# Patient Record
Sex: MALE | Race: OTHER | NOT HISPANIC OR LATINO
[De-identification: names, ages, dates, MRNs, and addresses within clinical notes are randomized per-mention and may not be internally consistent; named-entity substitution may affect disease eponyms.]

---

## 2018-11-19 PROBLEM — Z00.00 ENCOUNTER FOR PREVENTIVE HEALTH EXAMINATION: Status: ACTIVE | Noted: 2018-11-19

## 2018-12-05 ENCOUNTER — APPOINTMENT (OUTPATIENT)
Dept: CT IMAGING | Facility: HOSPITAL | Age: 83
End: 2018-12-05

## 2018-12-05 VITALS
RESPIRATION RATE: 20 BRPM | SYSTOLIC BLOOD PRESSURE: 141 MMHG | OXYGEN SATURATION: 93 % | DIASTOLIC BLOOD PRESSURE: 70 MMHG | HEART RATE: 106 BPM

## 2018-12-05 RX ORDER — TERAZOSIN HYDROCHLORIDE 10 MG/1
1 CAPSULE ORAL
Qty: 0 | Refills: 0 | COMMUNITY

## 2018-12-05 RX ORDER — FINASTERIDE 5 MG/1
1 TABLET, FILM COATED ORAL
Qty: 0 | Refills: 0 | COMMUNITY
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
## 2018-12-05 NOTE — H&P ADULT - NSHPSOCIALHISTORY_GEN_ALL_CORE
Former smoker (1ppd x approx 30 years), quit 40 years ago. Former ETOH use, none in the last several years. Denies illicit drug use.

## 2018-12-05 NOTE — H&P ADULT - PMH
Atrial flutter    BPH (benign prostatic hyperplasia)    Congestive heart failure    Coronary artery disease    HTN (hypertension)    Neuropathy

## 2018-12-05 NOTE — H&P ADULT - ASSESSMENT
86 y/o M, former smoker, walks with cane, with PMHx HTN, CAD s/p prior MI (denies history of stents), CHF (on Lasix BID, EF unknown), atrial fibrillation/flutter on Coumadin (last dose 12/4/18), PUD (no history of GI bleeding), recently diagnosed KOKO (does not yet have machine for home), multiple falls in the last 2 months, ?CKD (Cr 1.30 in October 2018), who initially presented to emergency room in [facility], [place] c/o worsening shortness of breath with palpitations. In light of patient’s risk factors, CCS Class III-IV angina equivalent symptoms, and extensive coronary calcium score, he is recommended for right and left cardiac catheterization with possible intervention if clinically indicated.     Patient's labs on arrival significant for INR 4.07, platelets 79, BUN/Cr 24/1.37, labs reviewed with Dr. Carlisle. Patient loaded with ASA 325mg and Plavix 600mg and hydrated with 0.45NS @ 50cc/hr pre-procedure as per Dr. Carlisle.     ASA III and Mallampati III  Risks & benefits of procedure and alternative therapy have been explained to the patient including but not limited to: allergic reaction, bleeding w/possible need for blood transfusion, infection, renal and vascular compromise, limb damage, arrhythmia, stroke, vessel dissection/perforation, Myocardial infarction, emergent CABG. Informed consent obtained and in chart. 86 y/o M, former smoker, walks with cane, with PMHx HTN, CAD s/p prior MI (denies history of stents), CHF (on Lasix BID, EF unknown), atrial fibrillation/flutter on Coumadin (last dose 12/4/18), PUD (no history of GI bleeding), recently diagnosed KOKO (does not yet have machine for home), multiple falls in the last 2 months, ?CKD (Cr 1.30 in October 2018), who initially presented to emergency room in [facility] c/o worsening shortness of breath with palpitations. In light of patient’s risk factors, CCS Class III-IV angina equivalent symptoms, and extensive coronary calcium score, he is recommended for right and left cardiac catheterization with possible intervention if clinically indicated.     Patient's labs on arrival significant for INR 4.07, platelets 79 (improved from 32 in October 2018), BUN/Cr 24/1.37, H/H stable at 15.4/46.8, labs reviewed with Dr. Carlisle. Patient loaded with ASA 325mg and Plavix 600mg and hydrated with 0.45NS @ 50cc/hr pre-procedure as per Dr. Carlisle.     ASA III and Mallampati III  Risks & benefits of procedure and alternative therapy have been explained to the patient including but not limited to: allergic reaction, bleeding w/possible need for blood transfusion, infection, renal and vascular compromise, limb damage, arrhythmia, stroke, vessel dissection/perforation, Myocardial infarction, emergent CABG. Informed consent obtained and in chart. 84 y/o M, former smoker, walks with cane, with PMHx HTN, CAD s/p prior MI (denies history of stents), CHF (on Lasix BID, EF unknown), atrial fibrillation/flutter on Coumadin (last dose 12/4/18), PUD (no history of GI bleeding), recently diagnosed KOKO (does not yet have machine for home), multiple falls in the last 2 months, ?CKD (Cr 1.30 in October 2018), who initially presented to emergency room in [facility] c/o worsening shortness of breath with palpitations. In light of patient’s risk factors, CCS Class III-IV angina equivalent symptoms, and extensive coronary calcium score, he is recommended for right and left cardiac catheterization with possible intervention if clinically indicated.     Patient's labs on arrival significant for INR 4.07, platelets 79 (improved from 32 in October 2018), BUN/Cr 24/1.37, H/H stable at 15.4/46.8, labs reviewed with Dr. Carlisle. Patient loaded with ASA 325mg and Plavix 600mg and hydrated with 0.45NS @ 50cc/hr pre-procedure as per Dr. Carlisle.     Platelet blue top tube sent showing platelets 59, Dr. Carlisle aware. Will pursue further work-up post-procedure. Type and screen sent.     ASA III and Mallampati III  Risks & benefits of procedure and alternative therapy have been explained to the patient including but not limited to: allergic reaction, bleeding w/possible need for blood transfusion, infection, renal and vascular compromise, limb damage, arrhythmia, stroke, vessel dissection/perforation, Myocardial infarction, emergent CABG. Informed consent obtained and in chart.

## 2018-12-05 NOTE — H&P ADULT - HISTORY OF PRESENT ILLNESS
84 y/o M, walks with cane, with PMHx HTN, CAD s/p prior MI with PCI?, CHF, atrial fibrillation/flutter on Coumadin (last dose 12/4/18), PUD (no history of GI bleeding), recently diagnosed KOKO (does not yet have machine for home), multiple falls in the last 2 months, who initially presented to emergency room in [facility] c/o worsening shortness of breath with palpitations. He has been experiencing shortness of breath with chest tightness for the last 2 months. He visited his primary doctor who adjusted his medications which improved his LE edema (patient reports chronic LE edema with blistering) but his SOB persisted. He can only walk < 1 block before he must stop secondary to SOB and chest tightness. He also reports difficulty sleeping/lying flat at night and frequently has only been able to sleep sitting up in a chair recently. He denies n/v, diaphoresis, lightheadedness, PND, or syncope. He had CT Chest/Calcium score performed 10/17/18 showed calcium score 2446; LM 0, , LAD 1094, LCx 615, The CTA with contrast was not performed as it was contraindicated given the extensive coronary calcium score and the irregular heartbeats due to atrial fibrillation. Also of note, CT Chest 10/20/18 showed 3.5x3.1cm infra-renal abdominal aortic aneurysm. Labs from 10/22/18 showed platelets 32. BUN/Cr 25/1.30. In light of patient’s risk factors, CCS Class III-IV angina equivalent symptoms, and extensive coronary calcium score, he is recommended for cardiac catheterization with possible intervention if clinically indicated. 84 y/o M, former smoker, walks with cane, with PMHx HTN, CAD s/p prior MI (denies history of stents), CHF (on Lasix BID, EF unknown), atrial fibrillation/flutter on Coumadin (last dose 12/4/18), PUD (no history of GI bleeding), recently diagnosed KOKO (does not yet have machine for home), multiple falls in the last 2 months, who initially presented to emergency room in [place] c/o worsening shortness of breath with palpitations. He has been experiencing shortness of breath with chest tightness for the last 2 months. He visited his primary doctor who adjusted his medications which improved his LE edema (patient reports chronic LE edema with blistering) but his SOB persisted. He can only walk < 1 block before he must stop secondary to SOB and chest tightness. He also reports difficulty sleeping/lying flat at night and frequently has only been able to sleep sitting up in a chair recently. He denies n/v, diaphoresis, lightheadedness, PND, or syncope. He had CT Chest/Calcium score performed 10/17/18 showed calcium score 2446; LM 0, , LAD 1094, LCx 615, The CTA with contrast was not performed as it was contraindicated given the extensive coronary calcium score and the irregular heartbeats due to atrial fibrillation. Also of note, CT Chest 10/20/18 showed 3.5x3.1cm infra-renal abdominal aortic aneurysm. Labs from 10/22/18 showed platelets 32. BUN/Cr 25/1.30. In light of patient’s risk factors, CCS Class III-IV angina equivalent symptoms, and extensive coronary calcium score, he is recommended for cardiac catheterization with possible intervention if clinically indicated. 86 y/o M, former smoker, walks with cane, with PMHx HTN, CAD s/p prior MI (denies history of stents), CHF (on Lasix BID, EF unknown), atrial fibrillation/flutter on Coumadin (last dose 12/4/18), PUD (no history of GI bleeding), recently diagnosed KOKO (does not yet have machine for home), multiple falls in the last 2 months, ?CKD (Cr 1.30 in October 2018), who initially presented to emergency room in [facility] c/o worsening shortness of breath with palpitations. He has been experiencing shortness of breath with chest tightness for the last 2 months. He visited his primary doctor who adjusted his medications which improved his LE edema (patient reports chronic LE edema with blistering) but his SOB persisted. He can only walk < 1 block before he must stop secondary to SOB and chest tightness. He also reports difficulty sleeping/lying flat at night and frequently has only been able to sleep sitting up in a chair recently. He denies n/v, diaphoresis, lightheadedness, PND, or syncope. He had CT Chest/Calcium score performed 10/17/18 showed calcium score 2446; LM 0, , LAD 1094, LCx 615, The CTA with contrast was not performed as it was contraindicated given the extensive coronary calcium score and the irregular heartbeats due to atrial fibrillation. Also of note, CT Chest 10/20/18 showed 3.5x3.1cm infra-renal abdominal aortic aneurysm. Labs from 10/22/18 showed platelets 32. BUN/Cr 25/1.30. In light of patient’s risk factors, CCS Class III-IV angina equivalent symptoms, and extensive coronary calcium score, he is recommended for cardiac catheterization with possible intervention if clinically indicated.

## 2018-12-06 ENCOUNTER — INPATIENT (INPATIENT)
Facility: HOSPITAL | Age: 83
LOS: 8 days | Discharge: HOME HEALTH | DRG: 233 | End: 2018-12-15
Attending: THORACIC SURGERY (CARDIOTHORACIC VASCULAR SURGERY) | Admitting: THORACIC SURGERY (CARDIOTHORACIC VASCULAR SURGERY)
Payer: COMMERCIAL

## 2018-12-06 ENCOUNTER — TRANSCRIPTION ENCOUNTER (OUTPATIENT)
Age: 83
End: 2018-12-06

## 2018-12-06 DIAGNOSIS — Z87.828 PERSONAL HISTORY OF OTHER (HEALED) PHYSICAL INJURY AND TRAUMA: Chronic | ICD-10-CM

## 2018-12-06 DIAGNOSIS — Z98.890 OTHER SPECIFIED POSTPROCEDURAL STATES: Chronic | ICD-10-CM

## 2018-12-06 DIAGNOSIS — K56.609 UNSPECIFIED INTESTINAL OBSTRUCTION, UNSPECIFIED AS TO PARTIAL VERSUS COMPLETE OBSTRUCTION: Chronic | ICD-10-CM

## 2018-12-06 DIAGNOSIS — Z90.49 ACQUIRED ABSENCE OF OTHER SPECIFIED PARTS OF DIGESTIVE TRACT: Chronic | ICD-10-CM

## 2018-12-06 LAB
ALBUMIN SERPL ELPH-MCNC: 3.8 G/DL — SIGNIFICANT CHANGE UP (ref 3.3–5)
ALP SERPL-CCNC: 81 U/L — SIGNIFICANT CHANGE UP (ref 40–120)
ALT FLD-CCNC: 32 U/L — SIGNIFICANT CHANGE UP (ref 10–45)
ANION GAP SERPL CALC-SCNC: 7 MMOL/L — SIGNIFICANT CHANGE UP (ref 5–17)
APTT BLD: 46.5 SEC — HIGH (ref 27.5–36.3)
AST SERPL-CCNC: 25 U/L — SIGNIFICANT CHANGE UP (ref 10–40)
BASOPHILS NFR BLD AUTO: 0.2 % — SIGNIFICANT CHANGE UP (ref 0–2)
BILIRUB SERPL-MCNC: 1.1 MG/DL — SIGNIFICANT CHANGE UP (ref 0.2–1.2)
BLD GP AB SCN SERPL QL: NEGATIVE — SIGNIFICANT CHANGE UP
BUN SERPL-MCNC: 24 MG/DL — HIGH (ref 7–23)
CALCIUM SERPL-MCNC: 10 MG/DL — SIGNIFICANT CHANGE UP (ref 8.4–10.5)
CHLORIDE SERPL-SCNC: 107 MMOL/L — SIGNIFICANT CHANGE UP (ref 96–108)
CHOLEST SERPL-MCNC: 124 MG/DL — SIGNIFICANT CHANGE UP (ref 10–199)
CK MB CFR SERPL CALC: 12 NG/ML — HIGH (ref 0–6.7)
CK SERPL-CCNC: 299 U/L — HIGH (ref 30–200)
CLOSURE TME COLL+EPINEP BLD: 59 K/UL — LOW (ref 150–400)
CO2 SERPL-SCNC: 29 MMOL/L — SIGNIFICANT CHANGE UP (ref 22–31)
CREAT SERPL-MCNC: 1.37 MG/DL — HIGH (ref 0.5–1.3)
CRP SERPL-MCNC: 0.07 MG/DL — SIGNIFICANT CHANGE UP (ref 0–0.4)
EOSINOPHIL NFR BLD AUTO: 1.2 % — SIGNIFICANT CHANGE UP (ref 0–6)
GLUCOSE SERPL-MCNC: 172 MG/DL — HIGH (ref 70–99)
HBA1C BLD-MCNC: 5.3 % — SIGNIFICANT CHANGE UP (ref 4–5.6)
HCT VFR BLD CALC: 46.8 % — SIGNIFICANT CHANGE UP (ref 39–50)
HDLC SERPL-MCNC: 52 MG/DL — SIGNIFICANT CHANGE UP
HGB BLD-MCNC: 15.4 G/DL — SIGNIFICANT CHANGE UP (ref 13–17)
INR BLD: 4.05 — HIGH (ref 0.88–1.16)
LIPID PNL WITH DIRECT LDL SERPL: 61 MG/DL — SIGNIFICANT CHANGE UP
LYMPHOCYTES # BLD AUTO: 6.1 % — LOW (ref 13–44)
MCHC RBC-ENTMCNC: 29 PG — SIGNIFICANT CHANGE UP (ref 27–34)
MCHC RBC-ENTMCNC: 32.9 G/DL — SIGNIFICANT CHANGE UP (ref 32–36)
MCV RBC AUTO: 88.1 FL — SIGNIFICANT CHANGE UP (ref 80–100)
MONOCYTES NFR BLD AUTO: 8.4 % — SIGNIFICANT CHANGE UP (ref 2–14)
NEUTROPHILS NFR BLD AUTO: 84.1 % — HIGH (ref 43–77)
PLATELET # BLD AUTO: 79 K/UL — LOW (ref 150–400)
POTASSIUM SERPL-MCNC: 4.2 MMOL/L — SIGNIFICANT CHANGE UP (ref 3.5–5.3)
POTASSIUM SERPL-SCNC: 4.2 MMOL/L — SIGNIFICANT CHANGE UP (ref 3.5–5.3)
PROT SERPL-MCNC: 6.4 G/DL — SIGNIFICANT CHANGE UP (ref 6–8.3)
PROTHROM AB SERPL-ACNC: 47.8 SEC — HIGH (ref 10–12.9)
RBC # BLD: 5.31 M/UL — SIGNIFICANT CHANGE UP (ref 4.2–5.8)
RBC # FLD: 14.1 % — SIGNIFICANT CHANGE UP (ref 10.3–16.9)
RH IG SCN BLD-IMP: POSITIVE — SIGNIFICANT CHANGE UP
SODIUM SERPL-SCNC: 143 MMOL/L — SIGNIFICANT CHANGE UP (ref 135–145)
TOTAL CHOLESTEROL/HDL RATIO MEASUREMENT: 2.4 RATIO — LOW (ref 3.4–9.6)
TRIGL SERPL-MCNC: 56 MG/DL — SIGNIFICANT CHANGE UP (ref 10–149)
WBC # BLD: 5.9 K/UL — SIGNIFICANT CHANGE UP (ref 3.8–10.5)
WBC # FLD AUTO: 5.9 K/UL — SIGNIFICANT CHANGE UP (ref 3.8–10.5)

## 2018-12-06 PROCEDURE — 93460 R&L HRT ART/VENTRICLE ANGIO: CPT | Mod: 26

## 2018-12-06 PROCEDURE — 93010 ELECTROCARDIOGRAM REPORT: CPT

## 2018-12-06 RX ORDER — METOPROLOL TARTRATE 50 MG
50 TABLET ORAL ONCE
Qty: 0 | Refills: 0 | Status: COMPLETED | OUTPATIENT
Start: 2018-12-06 | End: 2018-12-06

## 2018-12-06 RX ORDER — ATORVASTATIN CALCIUM 80 MG/1
80 TABLET, FILM COATED ORAL AT BEDTIME
Qty: 0 | Refills: 0 | Status: DISCONTINUED | OUTPATIENT
Start: 2018-12-06 | End: 2018-12-07

## 2018-12-06 RX ORDER — SODIUM CHLORIDE 9 MG/ML
500 INJECTION, SOLUTION INTRAVENOUS
Qty: 0 | Refills: 0 | Status: DISCONTINUED | OUTPATIENT
Start: 2018-12-06 | End: 2018-12-06

## 2018-12-06 RX ORDER — ASPIRIN/CALCIUM CARB/MAGNESIUM 324 MG
325 TABLET ORAL ONCE
Qty: 0 | Refills: 0 | Status: COMPLETED | OUTPATIENT
Start: 2018-12-06 | End: 2018-12-06

## 2018-12-06 RX ORDER — LISINOPRIL 2.5 MG/1
5 TABLET ORAL DAILY
Qty: 0 | Refills: 0 | Status: DISCONTINUED | OUTPATIENT
Start: 2018-12-06 | End: 2018-12-08

## 2018-12-06 RX ORDER — CHLORHEXIDINE GLUCONATE 213 G/1000ML
1 SOLUTION TOPICAL ONCE
Qty: 0 | Refills: 0 | Status: DISCONTINUED | OUTPATIENT
Start: 2018-12-06 | End: 2018-12-06

## 2018-12-06 RX ORDER — FUROSEMIDE 40 MG
40 TABLET ORAL ONCE
Qty: 0 | Refills: 0 | Status: COMPLETED | OUTPATIENT
Start: 2018-12-06 | End: 2018-12-06

## 2018-12-06 RX ORDER — DILTIAZEM HCL 120 MG
180 CAPSULE, EXT RELEASE 24 HR ORAL DAILY
Qty: 0 | Refills: 0 | Status: DISCONTINUED | OUTPATIENT
Start: 2018-12-06 | End: 2018-12-10

## 2018-12-06 RX ORDER — INFLUENZA VIRUS VACCINE 15; 15; 15; 15 UG/.5ML; UG/.5ML; UG/.5ML; UG/.5ML
0.5 SUSPENSION INTRAMUSCULAR ONCE
Qty: 0 | Refills: 0 | Status: DISCONTINUED | OUTPATIENT
Start: 2018-12-06 | End: 2018-12-07

## 2018-12-06 RX ORDER — CLOPIDOGREL BISULFATE 75 MG/1
600 TABLET, FILM COATED ORAL ONCE
Qty: 0 | Refills: 0 | Status: COMPLETED | OUTPATIENT
Start: 2018-12-06 | End: 2018-12-06

## 2018-12-06 RX ORDER — FINASTERIDE 5 MG/1
5 TABLET, FILM COATED ORAL DAILY
Qty: 0 | Refills: 0 | Status: DISCONTINUED | OUTPATIENT
Start: 2018-12-06 | End: 2018-12-07

## 2018-12-06 RX ORDER — FUROSEMIDE 40 MG
40 TABLET ORAL
Qty: 0 | Refills: 0 | Status: DISCONTINUED | OUTPATIENT
Start: 2018-12-07 | End: 2018-12-07

## 2018-12-06 RX ADMIN — Medication 5 MILLIGRAM(S): at 22:19

## 2018-12-06 RX ADMIN — Medication 50 MILLIGRAM(S): at 18:53

## 2018-12-06 RX ADMIN — Medication 325 MILLIGRAM(S): at 13:55

## 2018-12-06 RX ADMIN — Medication 40 MILLIGRAM(S): at 18:53

## 2018-12-06 RX ADMIN — ATORVASTATIN CALCIUM 80 MILLIGRAM(S): 80 TABLET, FILM COATED ORAL at 22:19

## 2018-12-06 RX ADMIN — SODIUM CHLORIDE 50 MILLILITER(S): 9 INJECTION, SOLUTION INTRAVENOUS at 13:48

## 2018-12-06 RX ADMIN — CLOPIDOGREL BISULFATE 600 MILLIGRAM(S): 75 TABLET, FILM COATED ORAL at 13:55

## 2018-12-07 DIAGNOSIS — N18.9 CHRONIC KIDNEY DISEASE, UNSPECIFIED: ICD-10-CM

## 2018-12-07 DIAGNOSIS — N40.0 BENIGN PROSTATIC HYPERPLASIA WITHOUT LOWER URINARY TRACT SYMPTOMS: ICD-10-CM

## 2018-12-07 DIAGNOSIS — I10 ESSENTIAL (PRIMARY) HYPERTENSION: ICD-10-CM

## 2018-12-07 DIAGNOSIS — I25.10 ATHEROSCLEROTIC HEART DISEASE OF NATIVE CORONARY ARTERY WITHOUT ANGINA PECTORIS: ICD-10-CM

## 2018-12-07 DIAGNOSIS — D69.6 THROMBOCYTOPENIA, UNSPECIFIED: ICD-10-CM

## 2018-12-07 DIAGNOSIS — I50.9 HEART FAILURE, UNSPECIFIED: ICD-10-CM

## 2018-12-07 DIAGNOSIS — N18.3 CHRONIC KIDNEY DISEASE, STAGE 3 (MODERATE): ICD-10-CM

## 2018-12-07 DIAGNOSIS — I48.92 UNSPECIFIED ATRIAL FLUTTER: ICD-10-CM

## 2018-12-07 DIAGNOSIS — I20.0 UNSTABLE ANGINA: ICD-10-CM

## 2018-12-07 DIAGNOSIS — I50.33 ACUTE ON CHRONIC DIASTOLIC (CONGESTIVE) HEART FAILURE: ICD-10-CM

## 2018-12-07 PROBLEM — G62.9 POLYNEUROPATHY, UNSPECIFIED: Chronic | Status: ACTIVE | Noted: 2018-12-05

## 2018-12-07 LAB
ALBUMIN SERPL ELPH-MCNC: 3.5 G/DL — SIGNIFICANT CHANGE UP (ref 3.3–5)
ALP SERPL-CCNC: 79 U/L — SIGNIFICANT CHANGE UP (ref 40–120)
ALT FLD-CCNC: 26 U/L — SIGNIFICANT CHANGE UP (ref 10–45)
ANION GAP SERPL CALC-SCNC: 5 MMOL/L — SIGNIFICANT CHANGE UP (ref 5–17)
APPEARANCE UR: CLEAR — SIGNIFICANT CHANGE UP
APPEARANCE UR: CLEAR — SIGNIFICANT CHANGE UP
APTT BLD: 41.2 SEC — HIGH (ref 27.5–36.3)
AST SERPL-CCNC: 17 U/L — SIGNIFICANT CHANGE UP (ref 10–40)
BASOPHILS NFR BLD AUTO: 0.2 % — SIGNIFICANT CHANGE UP (ref 0–2)
BILIRUB SERPL-MCNC: 1.1 MG/DL — SIGNIFICANT CHANGE UP (ref 0.2–1.2)
BILIRUB UR-MCNC: NEGATIVE — SIGNIFICANT CHANGE UP
BILIRUB UR-MCNC: NEGATIVE — SIGNIFICANT CHANGE UP
BUN SERPL-MCNC: 19 MG/DL — SIGNIFICANT CHANGE UP (ref 7–23)
CALCIUM SERPL-MCNC: 10.1 MG/DL — SIGNIFICANT CHANGE UP (ref 8.4–10.5)
CHLORIDE SERPL-SCNC: 106 MMOL/L — SIGNIFICANT CHANGE UP (ref 96–108)
CO2 SERPL-SCNC: 32 MMOL/L — HIGH (ref 22–31)
COLOR SPEC: YELLOW — SIGNIFICANT CHANGE UP
COLOR SPEC: YELLOW — SIGNIFICANT CHANGE UP
CREAT SERPL-MCNC: 1.39 MG/DL — HIGH (ref 0.5–1.3)
DIFF PNL FLD: NEGATIVE — SIGNIFICANT CHANGE UP
DIFF PNL FLD: NEGATIVE — SIGNIFICANT CHANGE UP
EOSINOPHIL NFR BLD AUTO: 1.6 % — SIGNIFICANT CHANGE UP (ref 0–6)
GLUCOSE SERPL-MCNC: 93 MG/DL — SIGNIFICANT CHANGE UP (ref 70–99)
GLUCOSE UR QL: NEGATIVE — SIGNIFICANT CHANGE UP
GLUCOSE UR QL: NEGATIVE — SIGNIFICANT CHANGE UP
HCT VFR BLD CALC: 48 % — SIGNIFICANT CHANGE UP (ref 39–50)
HGB BLD-MCNC: 15.2 G/DL — SIGNIFICANT CHANGE UP (ref 13–17)
INR BLD: 3.36 — HIGH (ref 0.88–1.16)
KETONES UR-MCNC: NEGATIVE — SIGNIFICANT CHANGE UP
KETONES UR-MCNC: NEGATIVE — SIGNIFICANT CHANGE UP
LEUKOCYTE ESTERASE UR-ACNC: ABNORMAL
LEUKOCYTE ESTERASE UR-ACNC: ABNORMAL
LYMPHOCYTES # BLD AUTO: 7.1 % — LOW (ref 13–44)
MAGNESIUM SERPL-MCNC: 2 MG/DL — SIGNIFICANT CHANGE UP (ref 1.6–2.6)
MCHC RBC-ENTMCNC: 28.2 PG — SIGNIFICANT CHANGE UP (ref 27–34)
MCHC RBC-ENTMCNC: 31.7 G/DL — LOW (ref 32–36)
MCV RBC AUTO: 89.1 FL — SIGNIFICANT CHANGE UP (ref 80–100)
MONOCYTES NFR BLD AUTO: 10.3 % — SIGNIFICANT CHANGE UP (ref 2–14)
NEUTROPHILS NFR BLD AUTO: 80.8 % — HIGH (ref 43–77)
NITRITE UR-MCNC: NEGATIVE — SIGNIFICANT CHANGE UP
NITRITE UR-MCNC: NEGATIVE — SIGNIFICANT CHANGE UP
PH UR: 6 — SIGNIFICANT CHANGE UP (ref 5–8)
PH UR: 6 — SIGNIFICANT CHANGE UP (ref 5–8)
PLATELET # BLD AUTO: 85 K/UL — LOW (ref 150–400)
POTASSIUM SERPL-MCNC: 4.1 MMOL/L — SIGNIFICANT CHANGE UP (ref 3.5–5.3)
POTASSIUM SERPL-SCNC: 4.1 MMOL/L — SIGNIFICANT CHANGE UP (ref 3.5–5.3)
PROT SERPL-MCNC: 6.1 G/DL — SIGNIFICANT CHANGE UP (ref 6–8.3)
PROT UR-MCNC: NEGATIVE MG/DL — SIGNIFICANT CHANGE UP
PROT UR-MCNC: NEGATIVE MG/DL — SIGNIFICANT CHANGE UP
PROTHROM AB SERPL-ACNC: 39.4 SEC — HIGH (ref 10–12.9)
RBC # BLD: 5.39 M/UL — SIGNIFICANT CHANGE UP (ref 4.2–5.8)
RBC # FLD: 14 % — SIGNIFICANT CHANGE UP (ref 10.3–16.9)
SODIUM SERPL-SCNC: 143 MMOL/L — SIGNIFICANT CHANGE UP (ref 135–145)
SP GR SPEC: 1.01 — SIGNIFICANT CHANGE UP (ref 1–1.03)
SP GR SPEC: 1.02 — SIGNIFICANT CHANGE UP (ref 1–1.03)
UROBILINOGEN FLD QL: 0.2 E.U./DL — SIGNIFICANT CHANGE UP
UROBILINOGEN FLD QL: 0.2 E.U./DL — SIGNIFICANT CHANGE UP
WBC # BLD: 5.6 K/UL — SIGNIFICANT CHANGE UP (ref 3.8–10.5)
WBC # FLD AUTO: 5.6 K/UL — SIGNIFICANT CHANGE UP (ref 3.8–10.5)

## 2018-12-07 PROCEDURE — 71250 CT THORAX DX C-: CPT | Mod: 26

## 2018-12-07 PROCEDURE — 71046 X-RAY EXAM CHEST 2 VIEWS: CPT | Mod: 26

## 2018-12-07 PROCEDURE — 93306 TTE W/DOPPLER COMPLETE: CPT | Mod: 26

## 2018-12-07 RX ORDER — FUROSEMIDE 40 MG
40 TABLET ORAL
Qty: 0 | Refills: 0 | Status: DISCONTINUED | OUTPATIENT
Start: 2018-12-07 | End: 2018-12-08

## 2018-12-07 RX ADMIN — LISINOPRIL 5 MILLIGRAM(S): 2.5 TABLET ORAL at 10:23

## 2018-12-07 RX ADMIN — FINASTERIDE 5 MILLIGRAM(S): 5 TABLET, FILM COATED ORAL at 10:23

## 2018-12-07 RX ADMIN — Medication 40 MILLIGRAM(S): at 10:23

## 2018-12-07 RX ADMIN — Medication 5 MILLIGRAM(S): at 22:15

## 2018-12-07 RX ADMIN — Medication 180 MILLIGRAM(S): at 06:06

## 2018-12-07 RX ADMIN — Medication 40 MILLIGRAM(S): at 18:03

## 2018-12-07 NOTE — PROGRESS NOTE ADULT - SUBJECTIVE AND OBJECTIVE BOX
INTERVENTIONAL CARDIOLOGY FOLLOW UP NOTE    -Patient seen and examined this am  -No events overnight  -No complaints this am    VASCULAR ACCESS EXAM:     RADIAL:   2+ right/left radial pulse   Normal capillary refill. No evidence of motor or sensory deficits of digits, hand, wrist or forearm.   -Access site clean, non-tender, without ecchymosis or hematoma.    A/P  R. radial approach with no evidence of vascular complications post procedure.     -continue with current medications including dual antiplatelet therapy  -pulmonary, renal, CTS, Heme consult   -discharge instructions as per protocol   -outpatient follow up with primary cardiologist

## 2018-12-07 NOTE — PROGRESS NOTE ADULT - SUBJECTIVE AND OBJECTIVE BOX
Interventional Cardiology PA Adult Progress Note    CC: SOB and chest pain  Subjective Assessment:    Residual SOB    12 point ROS otherwise negative except as stated in HPI and subjective. 	  MEDICATIONS:  diltiazem    milliGRAM(s) Oral daily  furosemide    Tablet 40 milliGRAM(s) Oral two times a day  lisinopril 5 milliGRAM(s) Oral daily  bisacodyl 5 milliGRAM(s) Oral at bedtime  atorvastatin 80 milliGRAM(s) Oral at bedtime  finasteride 5 milliGRAM(s) Oral daily  influenza   Vaccine 0.5 milliLiter(s) IntraMuscular once      PHYSICAL EXAM:  TELEMETRY: persistent afib  T(C): 36.9 (12-07-18 @ 14:11), Max: 37.2 (12-07-18 @ 06:10)  HR: 81 (12-07-18 @ 10:21) (78 - 108)  BP: 137/85 (12-07-18 @ 10:21) (126/79 - 148/86)  RR: 18 (12-07-18 @ 10:21) (16 - 21)  SpO2: 95% (12-07-18 @ 10:21) (94% - 98%)  Wt(kg): --  I&O's Summary    06 Dec 2018 07:01  -  07 Dec 2018 07:00  --------------------------------------------------------  IN: 0 mL / OUT: 2600 mL / NET: -2600 mL    07 Dec 2018 07:01  -  07 Dec 2018 16:46  --------------------------------------------------------  IN: 360 mL / OUT: 850 mL / NET: -490 mL      Height (cm): 180.34 (12-06 @ 19:52)  Weight (kg): 104.5 (12-06 @ 19:52)  BMI (kg/m2): 32.1 (12-06 @ 19:52)  BSA (m2): 2.24 (12-06 @ 19:52)  Avina: none  Central/PICC/Mid Line: none                                     Appearance: Normal	  HEENT:   Normal oral mucosa, PERRL, EOMI	  Neck: Supple, - JVD  Cardiovascular: Normal S1 S2, No JVD, No murmurs,   Respiratory: decreased breath sounds left lower lobe. 	  Gastrointestinal:  Soft, Non-tender, + BS	  Skin: No rashes, No ecchymoses, No cyanosis  Extremities: Normal range of motion, No clubbing, cyanosis or edema  Vascular: Peripheral pulses palpable 2+ bilaterally  Neurologic: Non-focal  Psychiatry: A & O x 3, Mood & affect appropriate    LABS:                          15.2   5.6   )-----------( 85       ( 07 Dec 2018 05:42 )             48.0     12-07    143  |  106  |  19  ----------------------------<  93  4.1   |  32<H>  |  1.39<H>    Ca    10.1      07 Dec 2018 05:42  Mg     2.0     12-07    TPro  6.1  /  Alb  3.5  /  TBili  1.1  /  DBili  x   /  AST  17  /  ALT  26  /  AlkPhos  79  12-07  PT/INR - ( 07 Dec 2018 05:42 )   PT: 39.4 sec;   INR: 3.36          PTT - ( 07 Dec 2018 05:42 )  PTT:41.2 sec    ASSESSMENT/PLAN:

## 2018-12-07 NOTE — PROGRESS NOTE ADULT - ATTENDING COMMENTS
Reviewed PA documentation. Reviewed vitals, labs, medications, cardiac studies and imaging 12/7/18. Agree with the above with the following additions/amendments:  85M [place] ([employer]) former smoker, with Essential HTN, CAD s/p prior MI, Chronic Diastolic HFpEF 50%, Atrial fibrillation/flutter on Coumadin (last dose 12/4/18), PUD (no history of GI bleeding), CKD Stage 3,KOKO who presented to SOB and palpitations. LHC 12/6/18 notable for extensive CAD, pLAD 100%  R-L collaterals, Mid % .  RHC 12/6 notable for PCWP 19. PAP 22.    Patient managed for acute on chronic diastolic HF exacerbation with Lasix 40mg IV BID.   CTSx service Dr Perez consulted and pt planned for MIDCAB on Monday 12/10/18.   Hematology consulted for TCP. Plan to hold atorvastatin and Finasteride and monitor for expected platelet recovery  Pulmonary consulted for evaluation of KOKO and possible restrictive lung disease, awaiting recs  Renal following for CKD 3 s/p contrast load and anticipated CTSx next week.  Physical therapy order    [facility]  Cardiology Attending

## 2018-12-07 NOTE — PROGRESS NOTE ADULT - PROBLEM SELECTOR PLAN 2
- - s/p right and left cardiac catheterization on 12/6/18: LM normal. pLAD 100%  R-L collaterals. LCX 30%. Mid % . LV not done. PCWP 19. PAP 22. Right radial off  - Dr Perez ([facility]) consulted for plan for MIDCAB on Monday 12/10/18. Preop orders

## 2018-12-07 NOTE — PROGRESS NOTE ADULT - ASSESSMENT
86 y/o M, former smoker, walks with cane, with PMHx HTN, CAD s/p prior MI (denies history of stents), CHF (on Lasix BID, EF unknown), atrial fibrillation/flutter on Coumadin (last dose 12/4/18), PUD (no history of GI bleeding), recently diagnosed KOKO (does not yet have machine for home), multiple falls in the last 2 months, ?CKD (Cr 1.30 in October 2018), who initially presented to emergency room in [facility] c/o worsening shortness of breath with palpitations. In light of patient’s risk factors, CCS Class III-IV angina equivalent symptoms, and extensive coronary calcium score, he is recommended for right and left cardiac catheterization with possible intervention if clinically indicated. Of note patient's labs on arrival significant for INR 4.07, platelets 79 (improved from 32 in October 2018), BUN/Cr 24/1.37, H/H stable at 15.4/46.8, labs reviewed with Dr. Carlisle. Patient loaded with ASA 325mg and Plavix 600mg and hydrated with 0.45NS @ 50cc/hr pre-procedure as per Dr. Carlisle. Platelet blue top tube sent showing platelets 59, Dr. Carlisle aware. Will pursue further work-up post-procedure. Type and screen sent. Pt now s/p diagnostic cardiac cath on 12/6/18 showing 84 y/o M, former smoker, walks with cane, with PMHx HTN, CAD s/p prior MI (denies history of stents), chronic diastolic CHF (EF 50%), atrial fibrillation/flutter on Coumadin (last dose 12/4/18), PUD (no history of GI bleeding), CKD Stage 3, recently diagnosed KOKO (does not yet have machine for home), multiple falls in the last 2 months, ?CKD (Cr 1.30 in October 2018), who initially presented to emergency room in [facility] c/o worsening shortness of breath with palpitations. In light of patient’s risk factors, CCS Class III-IV angina equivalent symptoms, and extensive coronary calcium score, he is recommended for right and left cardiac catheterization with possible intervention if clinically indicated. Of note patient's labs on arrival significant for INR 4.07, platelets 79 (improved from 32 in October 2018), BUN/Cr 24/1.37, H/H stable at 15.4/46.8, labs reviewed with Dr. Carlisle. Patient loaded with ASA 325mg and Plavix 600mg and hydrated with 0.45NS @ 50cc/hr pre-procedure as per Dr. Carlisle. Platelet blue top tube sent showing platelets 59, Dr. Carlisle aware. Will pursue further work-up post-procedure. Type and screen sent. Pt now s/p diagnostic cardiac cath on 12/6/18 showing LM normal. pLAD 100%  R-L collaterals. LCX 30%. Mid % . LV not done. PCWP 19. PAP 22. Right radial removed. Right brachial 14G IV remains in place. Pt in acute on chronic diastolic CHF exacerbation and being diuresed with Lasix 40mg IV BID. Dr Perez consulted and pt planned for MIDCAB on Monday 12/10/18. Dr Sneed consulted for thrombocytopenia and Atorvastatin and Finasteride being held. Dr Sweet from pulmonology consulted for KOKO and possible restrictive lung disease. Renal is consulted for CKD stage 3. 86 y/o M, former smoker, walks with cane, with PMHx HTN, CAD s/p prior MI (denies history of stents), chronic diastolic CHF (EF 50%), atrial fibrillation/flutter on Coumadin (last dose 12/4/18), PUD (no history of GI bleeding), CKD Stage 3, recently diagnosed KOKO (does not yet have machine for home), multiple falls in the last 2 months, ?CKD (Cr 1.30 in October 2018), who initially presented to emergency room in [facility] c/o worsening shortness of breath with palpitations. In light of patient’s risk factors, CCS Class III-IV angina equivalent symptoms, and extensive coronary calcium score, he is recommended for right and left cardiac catheterization with possible intervention if clinically indicated. Of note patient's labs on arrival significant for INR 4.07, platelets 79 (improved from 32 in October 2018), BUN/Cr 24/1.37, H/H stable at 15.4/46.8, labs reviewed with Dr. Carlisle. Patient loaded with ASA 325mg and Plavix 600mg and hydrated with 0.45NS @ 50cc/hr pre-procedure as per Dr. Carlisle. Platelet blue top tube sent showing platelets 59, Dr. Carlisle aware. Will pursue further work-up post-procedure. Type and screen sent. Pt now s/p diagnostic cardiac cath on 12/6/18 showing LM normal. pLAD 100%  R-L collaterals. LCX 30%. Mid % . LV not done. PCWP 19. PAP 22. Right radial removed. Right brachial 14G IV remains in place. Pt in acute on chronic diastolic CHF exacerbation and being diuresed with Lasix 40mg IV BID. Dr Perez consulted and pt planned for MIDCAB on Monday 12/10/18. Dr Sneed consulted for thrombocytopenia and Atorvastatin and Finasteride being held. Dr Sweet from pulmonology consulted for KOKO and possible restrictive lung disease. Renal is consulted for CKD stage 3. Pt now remaining on 5 Uris for IV diuresis and preop workup for CTS on Monday.

## 2018-12-07 NOTE — PROGRESS NOTE ADULT - PROBLEM SELECTOR PLAN 4
- Cr 1.3s. Pt had Cr 1.3 in [facility] in 10/2018.  - UA showed mod leuk esterase and negative nitrites. Repeat UA/UCx ordered  - f/u retroperitoneal US and urine electrolytes  - Renal consulted

## 2018-12-07 NOTE — CONSULT NOTE ADULT - SUBJECTIVE AND OBJECTIVE BOX
HPI:  86 y/o M, former smoker, walks with cane, with PMHx HTN, CAD s/p prior MI (denies history of stents), CHF (on Lasix BID, EF unknown), atrial fibrillation/flutter on Coumadin (last dose 12/4/18), PUD (no history of GI bleeding), recently diagnosed KOKO (does not yet have machine for home), multiple falls in the last 2 months, ?CKD (Cr 1.30 in October 2018), who initially presented to emergency room in [facility] c/o worsening shortness of breath with palpitations. He has been experiencing shortness of breath with chest tightness for the last 2 months. He visited his primary doctor who adjusted his medications which improved his LE edema (patient reports chronic LE edema with blistering) but his SOB persisted. He can only walk < 1 block before he must stop secondary to SOB and chest tightness. He also reports difficulty sleeping/lying flat at night and frequently has only been able to sleep sitting up in a chair recently. He denies n/v, diaphoresis, lightheadedness, PND, or syncope. He had CT Chest/Calcium score performed 10/17/18 showed calcium score 2446; LM 0, , LAD 1094, LCx 615, The CTA with contrast was not performed as it was contraindicated given the extensive coronary calcium score and the irregular heartbeats due to atrial fibrillation. Also of note, CT Chest 10/20/18 showed 3.5x3.1cm infra-renal abdominal aortic aneurysm. Labs from 10/22/18 showed platelets 32. BUN/Cr 25/1.30. In light of patient’s risk factors, CCS Class III-IV angina equivalent symptoms, and extensive coronary calcium score, he is recommended for cardiac catheterization with possible intervention if clinically indicated. (05 Dec 2018 12:01)    FAMILY HISTORY:  Family history of diabetes mellitus (Sibling)    MEDICATIONS  (STANDING):  bisacodyl 5 milliGRAM(s) Oral at bedtime  diltiazem    milliGRAM(s) Oral daily  furosemide   Injectable 40 milliGRAM(s) IV Push two times a day  lisinopril 5 milliGRAM(s) Oral daily    MEDICATIONS  (PRN):    Vital Signs Last 24 Hrs  T(C): 36.6 (07 Dec 2018 18:53), Max: 37.2 (07 Dec 2018 06:10)  T(F): 97.9 (07 Dec 2018 18:53), Max: 98.9 (07 Dec 2018 06:10)  HR: 92 (07 Dec 2018 20:27) (79 - 108)  BP: 122/79 (07 Dec 2018 20:27) (122/77 - 138/95)  BP(mean): --  RR: 18 (07 Dec 2018 20:27) (16 - 21)  SpO2: 95% (07 Dec 2018 20:27) (94% - 98%)PHYSICAL EXAM:    Constitutional:    Neck:    Breasts:    Respiratory:    Cardiovascular:    Gastrointestinal:    Extremities:    Neurological:    Skin:    Lymph Nodes:      Labs:  CBC Full  -  ( 07 Dec 2018 20:22 )  WBC Count : 6.8 K/uL  Hemoglobin : 15.1 g/dL  Hematocrit : 47.3 %  Platelet Count - Automated : 92 K/uL  Mean Cell Volume : 89.1 fL  Mean Cell Hemoglobin : 28.4 pg  Mean Cell Hemoglobin Concentration : 31.9 g/dL  Auto Neutrophil # : x  Auto Lymphocyte # : x  Auto Monocyte # : x  Auto Eosinophil # : x  Auto Basophil # : x  Auto Neutrophil % : x  Auto Lymphocyte % : x  Auto Monocyte % : x  Auto Eosinophil % : x  Auto Basophil % : x    12-07    143  |  106  |  19  ----------------------------<  93  4.1   |  32<H>  |  1.39<H>    Ca    10.1      07 Dec 2018 05:42  Mg     2.0     12-07    TPro  6.1  /  Alb  3.5  /  TBili  1.1  /  DBili  x   /  AST  17  /  ALT  26  /  AlkPhos  79  12-07      Radiology:  HEALTH ISSUES - R/O PROBLEM Dx:    Assessmant:  Plan:    Thank you  Sonam Sneed MD HPI:  86 y/o M, former smoker, walks with cane, with PMHx HTN, CAD s/p prior MI (denies history of stents), CHF (on Lasix BID, EF unknown), atrial fibrillation/flutter on Coumadin (last dose 12/4/18), PUD (no history of GI bleeding), recently diagnosed KOKO (does not yet have machine for home), multiple falls in the last 2 months, ?CKD (Cr 1.30 in October 2018), who initially presented to emergency room in [facility] c/o worsening shortness of breath with palpitations. He has been experiencing shortness of breath with chest tightness for the last 2 months. He visited his primary doctor who adjusted his medications which improved his LE edema (patient reports chronic LE edema with blistering) but his SOB persisted. He can only walk < 1 block before he must stop secondary to SOB and chest tightness. He also reports difficulty sleeping/lying flat at night and frequently has only been able to sleep sitting up in a chair recently. He denies n/v, diaphoresis, lightheadedness, PND, or syncope. He had CT Chest/Calcium score performed 10/17/18 showed calcium score 2446; LM 0, , LAD 1094, LCx 615, The CTA with contrast was not performed as it was contraindicated given the extensive coronary calcium score and the irregular heartbeats due to atrial fibrillation. Also of note, CT Chest 10/20/18 showed 3.5x3.1cm infra-renal abdominal aortic aneurysm. Labs from 10/22/18 showed platelets 32. BUN/Cr 25/1.30. In light of patient’s risk factors, CCS Class III-IV angina equivalent symptoms, and extensive coronary calcium score, he is recommended for cardiac catheterization with possible intervention if clinically indicated. (05 Dec 2018 12:01)    FAMILY HISTORY:  Family history of diabetes mellitus (Sibling)    MEDICATIONS  (STANDING):  bisacodyl 5 milliGRAM(s) Oral at bedtime  diltiazem    milliGRAM(s) Oral daily  furosemide   Injectable 40 milliGRAM(s) IV Push two times a day  lisinopril 5 milliGRAM(s) Oral daily    MEDICATIONS  (PRN):    Vital Signs Last 24 Hrs  T(C): 36.6 (07 Dec 2018 18:53), Max: 37.2 (07 Dec 2018 06:10)  T(F): 97.9 (07 Dec 2018 18:53), Max: 98.9 (07 Dec 2018 06:10)  HR: 92 (07 Dec 2018 20:27) (79 - 108)  BP: 122/79 (07 Dec 2018 20:27) (122/77 - 138/95)  BP(mean): --  RR: 18 (07 Dec 2018 20:27) (16 - 21)  SpO2: 95% (07 Dec 2018 20:27) (94% - 98%)PHYSICAL EXAM:    Constitutional:    Neck:    Breasts:    Respiratory:    Cardiovascular:    Gastrointestinal:    Extremities:    Neurological:    Skin:    Lymph Nodes:      Labs:  CBC Full  -  ( 07 Dec 2018 20:22 )  WBC Count : 6.8 K/uL  Hemoglobin : 15.1 g/dL  Hematocrit : 47.3 %  Platelet Count - Automated : 92 K/uL  Mean Cell Volume : 89.1 fL  Mean Cell Hemoglobin : 28.4 pg  Mean Cell Hemoglobin Concentration : 31.9 g/dL  Auto Neutrophil # : x  Auto Lymphocyte # : x  Auto Monocyte # : x  Auto Eosinophil # : x  Auto Basophil # : x  Auto Neutrophil % : x  Auto Lymphocyte % : x  Auto Monocyte % : x  Auto Eosinophil % : x  Auto Basophil % : x    12-07    143  |  106  |  19  ----------------------------<  93  4.1   |  32<H>  |  1.39<H>    Ca    10.1      07 Dec 2018 05:42  Mg     2.0     12-07    TPro  6.1  /  Alb  3.5  /  TBili  1.1  /  DBili  x   /  AST  17  /  ALT  26  /  AlkPhos  79  12-07      Radiology:  HEALTH ISSUES - R/O PROBLEM Dx:    Assessmant:  1)Thrombocytopenia on admission which improved during hospitalisation can have a couple of  explanations:  - a viral syndrome suppressing platelet production  - foalte deficiency which corrected with hospital food    Plan:  Vitamin B 12 and folate supplementation  d/c statin,   monitor platelet count  2) Patient was on Coumadin with supratherapeutic INR  Continue to hold Coumadin and monitor INR , if it falls < 2 start Heparin  Thank you  will follow with you  Sonam Sneed MD

## 2018-12-07 NOTE — CONSULT NOTE ADULT - ASSESSMENT
86 yo M w/ PMHx of HTN, CAD, KOKO, afib on coumadin, CKD II-III, for CABG after cath on 12/6 revealed extensive disease, nephrology consulted for CKD.

## 2018-12-07 NOTE — CONSULT NOTE ADULT - PROBLEM SELECTOR RECOMMENDATION 4
's-130's, diastolics in 70's-90's. On cardizem and lisinopril.   - check microalbumin/cr ratio 's-130's, diastolics in 70's-90's. On cardizem and lisinopril.   - check protein/cr ratio

## 2018-12-07 NOTE — PROGRESS NOTE ADULT - PROBLEM SELECTOR PLAN 3
- Rate controlled afib.  - Continue CARDIZEM 180mg daily  - INR 3.3 on 12/7 AM. Start heparin drip once INR drops lower than 2.
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
## 2018-12-07 NOTE — CONSULT NOTE ADULT - ASSESSMENT
85 year old male, former smoker, walks with cane, with PMHx HTN, CAD s/p prior MI (denies history of stents), dCHF, atrial fibrillation/flutter on coumadin (last dose 12/4/18), PUD (no history of GI bleeding), recently diagnosed KOKO (does not yet have machine for home), multiple falls in the last 2 months, ?CKD (Cr 1.30 in October 2018), who initially presented to emergency room in [place] c/o worsening shortness of breath with palpitations. He has been experiencing shortness of breath with chest tightness for the last 2 months. He visited his primary doctor who adjusted his medications which improved his LE edema (patient reports chronic LE edema with blistering) but his SOB persisted. He can walk <1 block before he must stop secondary to SOB and chest tightness. He also reports difficulty sleeping/lying flat at night and frequently has only been able to sleep sitting up in a chair recently. He denies N/V, diaphoresis, lightheadedness, PND, or syncope. In light of patient’s risk factors, CCS Class III-IV angina equivalent symptoms, and extensive coronary calcium score, he underwent a cardiac cath which revealed pLAD 100% with R-L collaterals mid TFD428%, and LCx 30%.  Dr. Perez was consulted to see the patient.      Plan:    -Admit to cardiology  -TTE performed  -Carotids, PFTs, CT chest (non contrast) to be done  -Hematology to see patient in light of thrombocytopenia, f/u recs  -Nephrology (Ayad) to see patient for Crt. 1.3, f/u recs  -Pulmonology (Kee) to see patient for ?restrictive lung disease, f/u recs  -Pre OP patient for Robotic Midcab in Monday with Dr. Perez 85 year old male, former smoker, walks with cane, with PMHx HTN, CAD s/p prior MI (denies history of stents), dCHF, atrial fibrillation/flutter on coumadin (last dose 12/4/18), PUD (no history of GI bleeding), recently diagnosed KOKO (does not yet have machine for home), multiple falls in the last 2 months, ?CKD (Cr 1.30 in October 2018), who initially presented to emergency room in [facility] c/o worsening shortness of breath with palpitations. He has been experiencing shortness of breath with chest tightness for the last 2 months. He visited his primary doctor who adjusted his medications which improved his LE edema (patient reports chronic LE edema with blistering) but his SOB persisted. He can walk <1 block before he must stop secondary to SOB and chest tightness. He also reports difficulty sleeping/lying flat at night and frequently has only been able to sleep sitting up in a chair recently. He denies N/V, diaphoresis, lightheadedness, PND, or syncope. In light of patient’s risk factors, CCS Class III-IV angina equivalent symptoms, and extensive coronary calcium score, he underwent a cardiac cath which revealed pLAD 100% with R-L collaterals mid VFW364%, and LCx 30%.  Dr. Perez was consulted to see the patient.      Plan:    -Admit to cardiology  -TTE performed  -Carotids, PFTs, RA ABG, CT chest (non contrast) to be done  -Hematology to see patient in light of thrombocytopenia, f/u recs  -Nephrology (Ayad) to see patient for Crt. 1.3, f/u recs  -Pulmonology (Kee) to see patient for ?restrictive lung disease, f/u recs  -Pre OP patient for Robotic Midcab in Monday with Dr. Perez  -Consent/Family consent signed and in the chart.

## 2018-12-07 NOTE — PROGRESS NOTE ADULT - PROBLEM SELECTOR PLAN 5
- Pt's platelets between 59-85k   - Dr Sneed consulted  - Holding Finasteride 5mg daily and Atorvastatin 80mg daily prior to CTS on Monday to see if platelets increase.

## 2018-12-08 DIAGNOSIS — G47.33 OBSTRUCTIVE SLEEP APNEA (ADULT) (PEDIATRIC): ICD-10-CM

## 2018-12-08 DIAGNOSIS — Z29.9 ENCOUNTER FOR PROPHYLACTIC MEASURES, UNSPECIFIED: ICD-10-CM

## 2018-12-08 DIAGNOSIS — R93.89 ABNORMAL FINDINGS ON DIAGNOSTIC IMAGING OF OTHER SPECIFIED BODY STRUCTURES: ICD-10-CM

## 2018-12-08 DIAGNOSIS — J98.11 ATELECTASIS: ICD-10-CM

## 2018-12-08 LAB
ALBUMIN SERPL ELPH-MCNC: 3.7 G/DL — SIGNIFICANT CHANGE UP (ref 3.3–5)
ALP SERPL-CCNC: 80 U/L — SIGNIFICANT CHANGE UP (ref 40–120)
ALT FLD-CCNC: 22 U/L — SIGNIFICANT CHANGE UP (ref 10–45)
ANION GAP SERPL CALC-SCNC: 8 MMOL/L — SIGNIFICANT CHANGE UP (ref 5–17)
APTT BLD: 35.4 SEC — SIGNIFICANT CHANGE UP (ref 27.5–36.3)
APTT BLD: 37.2 SEC — HIGH (ref 27.5–36.3)
AST SERPL-CCNC: 16 U/L — SIGNIFICANT CHANGE UP (ref 10–40)
BASE EXCESS BLDA CALC-SCNC: 4.3 MMOL/L — HIGH (ref -2–3)
BASOPHILS NFR BLD AUTO: 0.2 % — SIGNIFICANT CHANGE UP (ref 0–2)
BILIRUB SERPL-MCNC: 1.2 MG/DL — SIGNIFICANT CHANGE UP (ref 0.2–1.2)
BUN SERPL-MCNC: 23 MG/DL — SIGNIFICANT CHANGE UP (ref 7–23)
CALCIUM SERPL-MCNC: 10.1 MG/DL — SIGNIFICANT CHANGE UP (ref 8.4–10.5)
CHLORIDE SERPL-SCNC: 101 MMOL/L — SIGNIFICANT CHANGE UP (ref 96–108)
CO2 SERPL-SCNC: 35 MMOL/L — HIGH (ref 22–31)
CREAT SERPL-MCNC: 1.53 MG/DL — HIGH (ref 0.5–1.3)
EOSINOPHIL NFR BLD AUTO: 1.1 % — SIGNIFICANT CHANGE UP (ref 0–6)
FOLATE SERPL-MCNC: 7.6 NG/ML — SIGNIFICANT CHANGE UP
GLUCOSE SERPL-MCNC: 100 MG/DL — HIGH (ref 70–99)
HCO3 BLDA-SCNC: 29 MMOL/L — HIGH (ref 21–28)
HCT VFR BLD CALC: 48.4 % — SIGNIFICANT CHANGE UP (ref 39–50)
HCT VFR BLD CALC: 49.3 % — SIGNIFICANT CHANGE UP (ref 39–50)
HGB BLD-MCNC: 15.3 G/DL — SIGNIFICANT CHANGE UP (ref 13–17)
HGB BLD-MCNC: 15.6 G/DL — SIGNIFICANT CHANGE UP (ref 13–17)
INR BLD: 2.31 — HIGH (ref 0.88–1.16)
LYMPHOCYTES # BLD AUTO: 9.4 % — LOW (ref 13–44)
MAGNESIUM SERPL-MCNC: 2 MG/DL — SIGNIFICANT CHANGE UP (ref 1.6–2.6)
MCHC RBC-ENTMCNC: 28.3 PG — SIGNIFICANT CHANGE UP (ref 27–34)
MCHC RBC-ENTMCNC: 28.3 PG — SIGNIFICANT CHANGE UP (ref 27–34)
MCHC RBC-ENTMCNC: 31.6 G/DL — LOW (ref 32–36)
MCHC RBC-ENTMCNC: 31.6 G/DL — LOW (ref 32–36)
MCV RBC AUTO: 89.5 FL — SIGNIFICANT CHANGE UP (ref 80–100)
MCV RBC AUTO: 89.5 FL — SIGNIFICANT CHANGE UP (ref 80–100)
MONOCYTES NFR BLD AUTO: 10 % — SIGNIFICANT CHANGE UP (ref 2–14)
NEUTROPHILS NFR BLD AUTO: 79.3 % — HIGH (ref 43–77)
NT-PROBNP SERPL-SCNC: 1323 PG/ML — HIGH (ref 0–300)
PCO2 BLDA: 44 MMHG — SIGNIFICANT CHANGE UP (ref 35–48)
PH BLDA: 7.44 — SIGNIFICANT CHANGE UP (ref 7.35–7.45)
PLATELET # BLD AUTO: 92 K/UL — LOW (ref 150–400)
PLATELET # BLD AUTO: 96 K/UL — LOW (ref 150–400)
PO2 BLDA: 93 MMHG — SIGNIFICANT CHANGE UP (ref 83–108)
POTASSIUM SERPL-MCNC: 4.2 MMOL/L — SIGNIFICANT CHANGE UP (ref 3.5–5.3)
POTASSIUM SERPL-SCNC: 4.2 MMOL/L — SIGNIFICANT CHANGE UP (ref 3.5–5.3)
PROT SERPL-MCNC: 6.6 G/DL — SIGNIFICANT CHANGE UP (ref 6–8.3)
PROTHROM AB SERPL-ACNC: 26.8 SEC — HIGH (ref 10–12.9)
RBC # BLD: 5.41 M/UL — SIGNIFICANT CHANGE UP (ref 4.2–5.8)
RBC # BLD: 5.51 M/UL — SIGNIFICANT CHANGE UP (ref 4.2–5.8)
RBC # FLD: 14.2 % — SIGNIFICANT CHANGE UP (ref 10.3–16.9)
RBC # FLD: 14.2 % — SIGNIFICANT CHANGE UP (ref 10.3–16.9)
SAO2 % BLDA: 98 % — SIGNIFICANT CHANGE UP (ref 95–100)
SODIUM SERPL-SCNC: 144 MMOL/L — SIGNIFICANT CHANGE UP (ref 135–145)
TSH SERPL-MCNC: 0.33 UIU/ML — LOW (ref 0.35–4.94)
VIT B12 SERPL-MCNC: 271 PG/ML — SIGNIFICANT CHANGE UP (ref 232–1245)
WBC # BLD: 6.5 K/UL — SIGNIFICANT CHANGE UP (ref 3.8–10.5)
WBC # BLD: 8.5 K/UL — SIGNIFICANT CHANGE UP (ref 3.8–10.5)
WBC # FLD AUTO: 6.5 K/UL — SIGNIFICANT CHANGE UP (ref 3.8–10.5)
WBC # FLD AUTO: 8.5 K/UL — SIGNIFICANT CHANGE UP (ref 3.8–10.5)

## 2018-12-08 PROCEDURE — 76775 US EXAM ABDO BACK WALL LIM: CPT | Mod: 26

## 2018-12-08 PROCEDURE — 93880 EXTRACRANIAL BILAT STUDY: CPT | Mod: 26

## 2018-12-08 PROCEDURE — 99233 SBSQ HOSP IP/OBS HIGH 50: CPT

## 2018-12-08 PROCEDURE — 99223 1ST HOSP IP/OBS HIGH 75: CPT | Mod: GC

## 2018-12-08 RX ORDER — FOLIC ACID 0.8 MG
1 TABLET ORAL DAILY
Qty: 0 | Refills: 0 | Status: DISCONTINUED | OUTPATIENT
Start: 2018-12-08 | End: 2018-12-10

## 2018-12-08 RX ORDER — FUROSEMIDE 40 MG
20 TABLET ORAL DAILY
Qty: 0 | Refills: 0 | Status: DISCONTINUED | OUTPATIENT
Start: 2018-12-09 | End: 2018-12-09

## 2018-12-08 RX ORDER — ASPIRIN/CALCIUM CARB/MAGNESIUM 324 MG
81 TABLET ORAL DAILY
Qty: 0 | Refills: 0 | Status: DISCONTINUED | OUTPATIENT
Start: 2018-12-08 | End: 2018-12-10

## 2018-12-08 RX ORDER — PREGABALIN 225 MG/1
1000 CAPSULE ORAL DAILY
Qty: 0 | Refills: 0 | Status: DISCONTINUED | OUTPATIENT
Start: 2018-12-08 | End: 2018-12-10

## 2018-12-08 RX ORDER — METOPROLOL TARTRATE 50 MG
25 TABLET ORAL ONCE
Qty: 0 | Refills: 0 | Status: COMPLETED | OUTPATIENT
Start: 2018-12-08 | End: 2018-12-08

## 2018-12-08 RX ORDER — PANTOPRAZOLE SODIUM 20 MG/1
40 TABLET, DELAYED RELEASE ORAL
Qty: 0 | Refills: 0 | Status: DISCONTINUED | OUTPATIENT
Start: 2018-12-08 | End: 2018-12-10

## 2018-12-08 RX ORDER — FUROSEMIDE 40 MG
20 TABLET ORAL EVERY 12 HOURS
Qty: 0 | Refills: 0 | Status: DISCONTINUED | OUTPATIENT
Start: 2018-12-08 | End: 2018-12-08

## 2018-12-08 RX ADMIN — Medication 1 MILLIGRAM(S): at 13:47

## 2018-12-08 RX ADMIN — LISINOPRIL 5 MILLIGRAM(S): 2.5 TABLET ORAL at 06:42

## 2018-12-08 RX ADMIN — PREGABALIN 1000 MICROGRAM(S): 225 CAPSULE ORAL at 13:47

## 2018-12-08 RX ADMIN — Medication 180 MILLIGRAM(S): at 06:42

## 2018-12-08 RX ADMIN — Medication 5 MILLIGRAM(S): at 21:37

## 2018-12-08 RX ADMIN — Medication 25 MILLIGRAM(S): at 08:36

## 2018-12-08 RX ADMIN — Medication 40 MILLIGRAM(S): at 06:42

## 2018-12-08 NOTE — CONSULT NOTE ADULT - ATTENDING COMMENTS
Likely CKD, less likely RIKY (?CRS) - check urine urea, creatinine and sodium. [place] u/a other than possible infx, please check urine culture.   Renal sono w bladder PVR and doppler to eval for CORRINE  itph, vit D 25 and 1,25, uric acid.  Cr stable, doing well with 40mg PO BID lasix, improving decomp. HF.   Optimized from renal standpoint for CABG Monday. Would hold Lisinopril day of surgery.   Had been using alleve 4tabs weekly, advised to stop.
Patient seen and examined with house-staff during bedside rounds.  Resident note read, including vitals, physical findings, laboratory data, and radiological reports.   Revisions included below.  Direct personal management at bed side and extensive interpretation of the data.  Plan was outlined and discussed in details with the housestaff.  Decision making of high complexity  Action taken for acute disease activity to reflect the level of care provided:  - medication reconciliation  - review laboratory data  Pulmonary status is stable for surgery. That side spirometry. Her baseline oxygen saturations normal

## 2018-12-08 NOTE — PROGRESS NOTE ADULT - PROBLEM SELECTOR PLAN 3
- Pt with episode of AFIB RVR this morning, better with additional Metoprolol tartrate 25 mg PO x 1 on top of standing Continue CARDIZEM 180mg daily.  HR is back to prior baseline at 100s bpm.   - will consider adding Metoprolol tartrate 25 mg BID as standing dose if HR is > 100 bpm.   - INR 2.3 on 12/7 AM.  Start heparin drip once INR drops lower than 2.
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
## 2018-12-08 NOTE — PROGRESS NOTE ADULT - SUBJECTIVE AND OBJECTIVE BOX
HPI:  84 y/o M, former smoker, walks with cane, with PMHx HTN, CAD s/p prior MI (denies history of stents), CHF (on Lasix BID, EF unknown), atrial fibrillation/flutter on Coumadin (last dose 12/4/18), PUD (no history of GI bleeding), recently diagnosed KOKO (does not yet have machine for home), multiple falls in the last 2 months, ?CKD (Cr 1.30 in October 2018), who initially presented to emergency room in [facility] c/o worsening shortness of breath with palpitations. He has been experiencing shortness of breath with chest tightness for the last 2 months. He visited his primary doctor who adjusted his medications which improved his LE edema (patient reports chronic LE edema with blistering) but his SOB persisted. He can only walk < 1 block before he must stop secondary to SOB and chest tightness. He also reports difficulty sleeping/lying flat at night and frequently has only been able to sleep sitting up in a chair recently. He denies n/v, diaphoresis, lightheadedness, PND, or syncope. He had CT Chest/Calcium score performed 10/17/18 showed calcium score 2446; LM 0, , LAD 1094, LCx 615, The CTA with contrast was not performed as it was contraindicated given the extensive coronary calcium score and the irregular heartbeats due to atrial fibrillation. Also of note, CT Chest 10/20/18 showed 3.5x3.1cm infra-renal abdominal aortic aneurysm. Labs from 10/22/18 showed platelets 32. BUN/Cr 25/1.30. In light of patient’s risk factors, CCS Class III-IV angina equivalent symptoms, and extensive coronary calcium score, he is recommended for cardiac catheterization with possible intervention if clinically indicated. (05 Dec 2018 12:01)    FAMILY HISTORY:  Family history of diabetes mellitus (Sibling)    MEDICATIONS  (STANDING):  aspirin enteric coated 81 milliGRAM(s) Oral daily  bisacodyl 5 milliGRAM(s) Oral at bedtime  cyanocobalamin Injectable 1000 MICROGram(s) IntraMuscular daily  diltiazem    milliGRAM(s) Oral daily  folic acid 1 milliGRAM(s) Oral daily  pantoprazole    Tablet 40 milliGRAM(s) Oral before breakfast    MEDICATIONS  (PRN):    Vital Signs Last 24 Hrs  T(C): 36.7 (08 Dec 2018 13:56), Max: 36.7 (08 Dec 2018 13:56)  T(F): 98.1 (08 Dec 2018 13:56), Max: 98.1 (08 Dec 2018 13:56)  HR: 74 (08 Dec 2018 12:30) (74 - 120)  BP: 107/57 (08 Dec 2018 18:05) (101/55 - 122/79)  BP(mean): --  RR: 18 (08 Dec 2018 18:05) (16 - 18)  SpO2: 95% (08 Dec 2018 18:05) (94% - 98%)    Physical exam:    Overall impression  Lymphadenopathy  Liver  spleen    Labs:  CBC Full  -  ( 08 Dec 2018 05:43 )  WBC Count : 6.5 K/uL  Hemoglobin : 15.6 g/dL  Hematocrit : 49.3 %  Platelet Count - Automated : 96 K/uL  Mean Cell Volume : 89.5 fL  Mean Cell Hemoglobin : 28.3 pg  Mean Cell Hemoglobin Concentration : 31.6 g/dL  Auto Neutrophil # : x  Auto Lymphocyte # : x  Auto Monocyte # : x  Auto Eosinophil # : x  Auto Basophil # : x  Auto Neutrophil % : 79.3 %  Auto Lymphocyte % : 9.4 %  Auto Monocyte % : 10.0 %  Auto Eosinophil % : 1.1 %  Auto Basophil % : 0.2 %    12-08    144  |  101  |  23  ----------------------------<  100<H>  4.2   |  35<H>  |  1.53<H>    Ca    10.1      08 Dec 2018 05:43  Mg     2.0     12-08    TPro  6.6  /  Alb  3.7  /  TBili  1.2  /  DBili  x   /  AST  16  /  ALT  22  /  AlkPhos  80  12-08      Radiology:  HEALTH ISSUES - R/O PROBLEM Dx:      Assessmant / Problems  Thrombocytopenia on admission, progressively improving in hospital  Possible causes:  Patient may have been on meds at home that may have caused it and is now recovering  may have Vitamin B12/folate deficiency that is now being corrected  may have had a viral infection and is currently recovering  Plan:  Vitamin B 12/ folate level ordered  Vitamin B 12 / folate supplementation ordered  monitor platelet count  can have Heparin during stent palcement Monday  Thank you  Sonam Sneed MD

## 2018-12-08 NOTE — CONSULT NOTE ADULT - PROBLEM SELECTOR RECOMMENDATION 3
For CABG during this admission
Most likely dependent changes. Patient will require mobilization out of bed
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
## 2018-12-08 NOTE — PROGRESS NOTE ADULT - SUBJECTIVE AND OBJECTIVE BOX
5 uris Cardiology PA Progress Note    S: No overnight event.  This morning around 7-8AM, pt felt some palpitation (tele with AFIb VR up to 140 bpm).  No associated CP , SOB, or dizziness.      O: T(C): 36.3 (12-08-18 @ 08:38), Max: 36.9 (12-07-18 @ 14:11)  HR: 120 (12-08-18 @ 08:41) (81 - 120)  BP: 110/72 (12-08-18 @ 08:41) (110/72 - 137/85)  RR: 18 (12-08-18 @ 09:14) (16 - 18)  SpO2: 96% (12-08-18 @ 09:14) (94% - 96%)  Wt(kg): 104.5 kg      TELE:  AFIB VR mostly 100s -- this morning HR up to 140 bpm     PHYSICAL  General:  NAD        Chest:  CTA B/L   Cardiac:   + s1/s2, RRR  Abdomen:  +BS , soft ND/NT  Extremities: No edema    LABS:                        15.6   6.5   )-----------( 96       ( 08 Dec 2018 05:43 )             49.3     12-08    144  |  101  |  23  ----------------------------<  100<H>  4.2   |  35<H>  |  1.53<H>    Ca    10.1      08 Dec 2018 05:43  Mg     2.0     12-08    TPro  6.6  /  Alb  3.7  /  TBili  1.2  /  DBili  x   /  AST  16  /  ALT  22  /  AlkPhos  80  12-08    PT/INR - ( 08 Dec 2018 05:43 )   PT: 26.8 sec;   INR: 2.31          PTT - ( 08 Dec 2018 05:43 )  PTT:37.2 sec    MEDICATIONS:  bisacodyl 5 milliGRAM(s) Oral at bedtime  diltiazem    milliGRAM(s) Oral daily  furosemide   Injectable 40 milliGRAM(s) IV Push two times a day  lisinopril 5 milliGRAM(s) Oral daily 5 uris Cardiology PA Progress Note    S: No overnight event.  This morning around 7-8AM, pt felt some palpitation (tele with AFIb VR up to 140 bpm).  No associated CP , SOB, or dizziness.      O: T(C): 36.3 (12-08-18 @ 08:38), Max: 36.9 (12-07-18 @ 14:11)  HR: 120 (12-08-18 @ 08:41) (81 - 120)  BP: 110/72 (12-08-18 @ 08:41) (110/72 - 137/85)  RR: 18 (12-08-18 @ 09:14) (16 - 18)  SpO2: 96% (12-08-18 @ 09:14) (94% - 96%)  Wt(kg): 104.5 kg      TELE:  AFIB VR mostly 100s -- this morning HR up to 140 bpm     PHYSICAL  Constitutional  NAD.   Neck: No JVD  Pulm: decreased BS right lower lung. No rale or wheeze   Cardiac:   + s1/s2, RRR, no murmur  GI:  +BS , soft ND/NT  Extremities: No LE edema.    Vascular: right radial pulse 2+. No hematoma. Neurovascular intact  Neuro: AAOX 3. No focal deficit  Skin: No rash or lesion     LABS:                        15.6   6.5   )-----------( 96       ( 08 Dec 2018 05:43 )             49.3     12-08    144  |  101  |  23  ----------------------------<  100<H>  4.2   |  35<H>  |  1.53<H>    Ca    10.1      08 Dec 2018 05:43  Mg     2.0     12-08    TPro  6.6  /  Alb  3.7  /  TBili  1.2  /  DBili  x   /  AST  16  /  ALT  22  /  AlkPhos  80  12-08    PT/INR - ( 08 Dec 2018 05:43 )   PT: 26.8 sec;   INR: 2.31          PTT - ( 08 Dec 2018 05:43 )  PTT:37.2 sec    MEDICATIONS:  bisacodyl 5 milliGRAM(s) Oral at bedtime  diltiazem    milliGRAM(s) Oral daily  furosemide   Injectable 40 milliGRAM(s) IV Push two times a day  lisinopril 5 milliGRAM(s) Oral daily

## 2018-12-08 NOTE — PROGRESS NOTE ADULT - PROBLEM SELECTOR PLAN 4
- Cr 1.5 today. Pt had Cr 1.3 in [facility] in 10/2018.    - f/u retroperitoneal US and urine electrolytes  - Renal on board

## 2018-12-08 NOTE — CONSULT NOTE ADULT - PROBLEM SELECTOR RECOMMENDATION 9
Patient has a history of obstructive sleep apnea but he refused CPAP. Patient will require CPAP mask postoperatively. Avoid oversedation

## 2018-12-08 NOTE — PROGRESS NOTE ADULT - PROBLEM SELECTOR PLAN 5
- Pt's platelets between 59-85k   - Dr Sneed consulted - recommended holding Finasteride 5mg daily and Atorvastatin 80mg daily prior to CTS on Monday to see if platelets increase. --- Platelet count is improving slowly (up to 96k today).

## 2018-12-08 NOTE — CONSULT NOTE ADULT - PROBLEM SELECTOR RECOMMENDATION 2
Can c/w lasix 40 mg BID  Trend BMP daily  As per primary
CT scan of the chest revealed bilateral lower lobe atelectasis. There are mediastinal adenopathy. I doubt that the bilateral abnormalities consistent with infectious process. Patient will require repeat CT scan of the chest once a stable postoperatively.

## 2018-12-08 NOTE — PROGRESS NOTE ADULT - PROBLEM SELECTOR PLAN 2
- s/p right and left cardiac catheterization on 12/6/18: LM normal. pLAD 100%  R-L collaterals. LCX 30%. Mid % . LV not done. PCWP 19. PAP 22. Right radial off  - Dr Perez ([facility]) consulted for plan for MIDCAB on Monday 12/10/18. Preop orders

## 2018-12-08 NOTE — PROGRESS NOTE ADULT - PROBLEM SELECTOR PLAN 6
- Currently still has therapeutic INR.  Once INR < 2, will start Heparin gtt. This is for h/o AFIB and for DVT prophylaxis.

## 2018-12-08 NOTE — PROGRESS NOTE ADULT - ASSESSMENT
84 yo M w/ HTN, CAD s/p prior MI, CHF, atrial fibrillation/flutter on Coumadin, PUD, recently diagnosed KOKO adm for decompensated HF s/p cath 12/6 pending CABG Mon with likely new dx of CKD III:   Likely CKD, less likely RIKY (?CRS), bland u/a, renal sono w bladder PVR and doppler to eval for CORRINE pending  itph, vit D 25 and 1,25, uric acid pending  Cr up slightly today, may be 2/2 KIA however suspect 2/2 diuresis given rise in CO2 - consider dosing lasix 20mg IV only once daily. Got 40mg PO and 40mg IV last night and another 40mg IV today, down 2kg, CXR improved, small unilateral effusion. Still req. 4Lnc.   Would hold Lisinopril day of CABG  BP well controlled.

## 2018-12-08 NOTE — PROGRESS NOTE ADULT - ATTENDING COMMENTS
Reviewed PA documentation. Reviewed vitals, labs, medications, cardiac studies and imaging 12/7/18. Agree with the above with the following additions/amendments:  85M Antonio ([employer] Patient) former smoker, with Essential HTN, CAD s/p prior MI, Chronic Diastolic HFpEF 50%, Atrial fibrillation/flutter on Coumadin (last dose 12/4/18), PUD (no history of GI bleeding), CKD Stage 3,KOKO who presented to SOB and palpitations. LHC 12/6/18 notable for extensive CAD, pLAD 100%  R-L collaterals, Mid % .  RHC 12/6 notable for PCWP 19. PAP 22.    Patient managed for acute on chronic diastolic HF exacerbation with Lasix 40mg IV BID.   CTSx service Dr Perez consulted and pt planned for MIDCAB on Monday 12/10/18.   Hematology consulted for TCP. Plan to hold atorvastatin and Finasteride and monitor for expected platelet recovery  Pulmonary consulted for evaluation of KOKO and possible restrictive lung disease, awaiting recs  Renal following for CKD 3 s/p contrast load and anticipated CTSx next week.  [facility]    [facility]  [facility] Attending Assessment: Patient personally seen and examined myself during rounds with the Physician Assistant/House Staff/Nurse Practitione  ON DATE  12/8/18  Physician Assistant/House Staff/Nurse Practitioner note read, including vitals, physical findings, laboratory data, and radiological reports.   Revisions included below.   Direct personal management at bed side and extensive interpretation of the data.    Plan was outlined and discussed in details with the Physician Assistant/House Staff/Nurse Practitioner.    Decision making of high complexity   Risk high of complications, morbidity, and/or mortality  Assessment and Action taken for acute disease activity to reflect the level of care provided:  -Hemodynamic evaluation and support  -Medication reconciliation  -Review laboratory data  -Cardiac Telemetry reviewed  - sofie of RVR - relievedwith BB x 1, ond[place] to [facility] and see if BB needs to be augmetned with CCB  -EKG reviewed   -Echo reviewed   -ACS assessment and treatment as applicable  -Heart failure assessment and treatment as applicable  -Interdisciplinary discussion with IC / EP / HF / CTS teams as needed  TIME SPENT in evaluation and management, reassessments, review and interpretation of labs and x-rays, and hemodynamic management, formulating a plan and coordinating care: ___35____ MIN.  Time does not include procedural time.    Ivan Perkins MD  Cardiology    Mobile: [phone]  Office: [phone]  [address] [place] [address]

## 2018-12-08 NOTE — PROGRESS NOTE ADULT - PROBLEM SELECTOR PLAN 1
- Echo 12/7/18 Moderate concentric LVH. Hypokinesis of basal inferior wall. EF 50%. LA/RA/RV normal. Trace AI. PASP 29mmHg.   - He is not endorsing SOB today.  Plan to decrease Lasix to 20 mg IV BID (got 40 mg IV this morning).   - Holding Lisinopril prior to CTS

## 2018-12-09 ENCOUNTER — RESULT REVIEW (OUTPATIENT)
Age: 83
End: 2018-12-09

## 2018-12-09 DIAGNOSIS — I48.91 UNSPECIFIED ATRIAL FIBRILLATION: ICD-10-CM

## 2018-12-09 LAB
ANION GAP SERPL CALC-SCNC: 8 MMOL/L — SIGNIFICANT CHANGE UP (ref 5–17)
APTT BLD: 173.2 SEC — CRITICAL HIGH (ref 27.5–36.3)
APTT BLD: 78.6 SEC — HIGH (ref 27.5–36.3)
BUN SERPL-MCNC: 32 MG/DL — HIGH (ref 7–23)
CALCIUM SERPL-MCNC: 10 MG/DL — SIGNIFICANT CHANGE UP (ref 8.4–10.5)
CHLORIDE SERPL-SCNC: 103 MMOL/L — SIGNIFICANT CHANGE UP (ref 96–108)
CO2 SERPL-SCNC: 34 MMOL/L — HIGH (ref 22–31)
CREAT SERPL-MCNC: 1.56 MG/DL — HIGH (ref 0.5–1.3)
GLUCOSE SERPL-MCNC: 98 MG/DL — SIGNIFICANT CHANGE UP (ref 70–99)
HCT VFR BLD CALC: 49.1 % — SIGNIFICANT CHANGE UP (ref 39–50)
HGB BLD-MCNC: 15.3 G/DL — SIGNIFICANT CHANGE UP (ref 13–17)
INR BLD: 1.86 — HIGH (ref 0.88–1.16)
MAGNESIUM SERPL-MCNC: 2 MG/DL — SIGNIFICANT CHANGE UP (ref 1.6–2.6)
MCHC RBC-ENTMCNC: 28 PG — SIGNIFICANT CHANGE UP (ref 27–34)
MCHC RBC-ENTMCNC: 31.2 G/DL — LOW (ref 32–36)
MCV RBC AUTO: 89.8 FL — SIGNIFICANT CHANGE UP (ref 80–100)
PLATELET # BLD AUTO: 81 K/UL — LOW (ref 150–400)
POTASSIUM SERPL-MCNC: 3.9 MMOL/L — SIGNIFICANT CHANGE UP (ref 3.5–5.3)
POTASSIUM SERPL-SCNC: 3.9 MMOL/L — SIGNIFICANT CHANGE UP (ref 3.5–5.3)
PROTHROM AB SERPL-ACNC: 21.4 SEC — HIGH (ref 10–12.9)
RBC # BLD: 5.47 M/UL — SIGNIFICANT CHANGE UP (ref 4.2–5.8)
RBC # FLD: 14.2 % — SIGNIFICANT CHANGE UP (ref 10.3–16.9)
SODIUM SERPL-SCNC: 145 MMOL/L — SIGNIFICANT CHANGE UP (ref 135–145)
T3FREE SERPL-MCNC: 1.33 PG/ML — LOW (ref 1.71–3.71)
T4 FREE SERPL-MCNC: 0.84 NG/DL — SIGNIFICANT CHANGE UP (ref 0.7–1.48)
WBC # BLD: 7.2 K/UL — SIGNIFICANT CHANGE UP (ref 3.8–10.5)
WBC # FLD AUTO: 7.2 K/UL — SIGNIFICANT CHANGE UP (ref 3.8–10.5)

## 2018-12-09 PROCEDURE — 99232 SBSQ HOSP IP/OBS MODERATE 35: CPT

## 2018-12-09 PROCEDURE — 76857 US EXAM PELVIC LIMITED: CPT | Mod: 26

## 2018-12-09 RX ORDER — CHLORHEXIDINE GLUCONATE 213 G/1000ML
1 SOLUTION TOPICAL ONCE
Qty: 0 | Refills: 0 | Status: COMPLETED | OUTPATIENT
Start: 2018-12-09 | End: 2018-12-09

## 2018-12-09 RX ORDER — HEPARIN SODIUM 5000 [USP'U]/ML
INJECTION INTRAVENOUS; SUBCUTANEOUS
Qty: 25000 | Refills: 0 | Status: DISCONTINUED | OUTPATIENT
Start: 2018-12-08 | End: 2018-12-09

## 2018-12-09 RX ORDER — HEPARIN SODIUM 5000 [USP'U]/ML
1200 INJECTION INTRAVENOUS; SUBCUTANEOUS
Qty: 25000 | Refills: 0 | Status: DISCONTINUED | OUTPATIENT
Start: 2018-12-09 | End: 2018-12-10

## 2018-12-09 RX ORDER — METOPROLOL TARTRATE 50 MG
12.5 TABLET ORAL EVERY 12 HOURS
Qty: 0 | Refills: 0 | Status: DISCONTINUED | OUTPATIENT
Start: 2018-12-09 | End: 2018-12-10

## 2018-12-09 RX ORDER — CHLORHEXIDINE GLUCONATE 213 G/1000ML
1 SOLUTION TOPICAL ONCE
Qty: 0 | Refills: 0 | Status: COMPLETED | OUTPATIENT
Start: 2018-12-10 | End: 2018-12-10

## 2018-12-09 RX ORDER — HEPARIN SODIUM 5000 [USP'U]/ML
1200 INJECTION INTRAVENOUS; SUBCUTANEOUS
Qty: 25000 | Refills: 0 | Status: DISCONTINUED | OUTPATIENT
Start: 2018-12-09 | End: 2018-12-09

## 2018-12-09 RX ORDER — HEPARIN SODIUM 5000 [USP'U]/ML
6000 INJECTION INTRAVENOUS; SUBCUTANEOUS EVERY 6 HOURS
Qty: 0 | Refills: 0 | Status: DISCONTINUED | OUTPATIENT
Start: 2018-12-09 | End: 2018-12-10

## 2018-12-09 RX ORDER — CHLORHEXIDINE GLUCONATE 213 G/1000ML
10 SOLUTION TOPICAL ONCE
Qty: 0 | Refills: 0 | Status: COMPLETED | OUTPATIENT
Start: 2018-12-09 | End: 2018-12-10

## 2018-12-09 RX ORDER — HEPARIN SODIUM 5000 [USP'U]/ML
8500 INJECTION INTRAVENOUS; SUBCUTANEOUS EVERY 6 HOURS
Qty: 0 | Refills: 0 | Status: DISCONTINUED | OUTPATIENT
Start: 2018-12-08 | End: 2018-12-09

## 2018-12-09 RX ORDER — HEPARIN SODIUM 5000 [USP'U]/ML
4000 INJECTION INTRAVENOUS; SUBCUTANEOUS EVERY 6 HOURS
Qty: 0 | Refills: 0 | Status: DISCONTINUED | OUTPATIENT
Start: 2018-12-08 | End: 2018-12-09

## 2018-12-09 RX ADMIN — CHLORHEXIDINE GLUCONATE 1 APPLICATION(S): 213 SOLUTION TOPICAL at 19:05

## 2018-12-09 RX ADMIN — HEPARIN SODIUM 1200 UNIT(S)/HR: 5000 INJECTION INTRAVENOUS; SUBCUTANEOUS at 19:02

## 2018-12-09 RX ADMIN — HEPARIN SODIUM 0 UNIT(S)/HR: 5000 INJECTION INTRAVENOUS; SUBCUTANEOUS at 06:34

## 2018-12-09 RX ADMIN — HEPARIN SODIUM 0 UNIT(S)/HR: 5000 INJECTION INTRAVENOUS; SUBCUTANEOUS at 14:28

## 2018-12-09 RX ADMIN — Medication 12.5 MILLIGRAM(S): at 19:02

## 2018-12-09 RX ADMIN — HEPARIN SODIUM 1500 UNIT(S)/HR: 5000 INJECTION INTRAVENOUS; SUBCUTANEOUS at 07:41

## 2018-12-09 RX ADMIN — Medication 180 MILLIGRAM(S): at 06:16

## 2018-12-09 RX ADMIN — Medication 5 MILLIGRAM(S): at 22:21

## 2018-12-09 RX ADMIN — PANTOPRAZOLE SODIUM 40 MILLIGRAM(S): 20 TABLET, DELAYED RELEASE ORAL at 06:16

## 2018-12-09 RX ADMIN — Medication 1 MILLIGRAM(S): at 13:32

## 2018-12-09 RX ADMIN — HEPARIN SODIUM 1200 UNIT(S)/HR: 5000 INJECTION INTRAVENOUS; SUBCUTANEOUS at 23:48

## 2018-12-09 RX ADMIN — HEPARIN SODIUM 1800 UNIT(S)/HR: 5000 INJECTION INTRAVENOUS; SUBCUTANEOUS at 00:12

## 2018-12-09 RX ADMIN — CHLORHEXIDINE GLUCONATE 1 APPLICATION(S): 213 SOLUTION TOPICAL at 22:21

## 2018-12-09 RX ADMIN — Medication 81 MILLIGRAM(S): at 13:32

## 2018-12-09 RX ADMIN — PREGABALIN 1000 MICROGRAM(S): 225 CAPSULE ORAL at 13:32

## 2018-12-09 NOTE — PROGRESS NOTE ADULT - ATTENDING COMMENTS
Assessment: Patient personally seen and examined myself during rounds with the Physician Assistant/House Staff/Nurse Practitioner  ON DATE 12/9/18  Physician Assistant/House Staff/Nurse Practitioner note read, including vitals, physical findings, laboratory data, and radiological reports.   Revisions included below.   Direct personal management at bed side and extensive interpretation of the data.    Plan was outlined and discussed in details with the Physician Assistant/House Staff/Nurse Practitioner.    Decision making of high complexity   Risk high of complications, morbidity, and/or mortality  Assessment and Action taken for acute disease activity to reflect the level of care provided:  -Hemodynamic evaluation and support  -Medication reconciliation  -Review laboratory data  -EKG reviewed   -    TIME SPENT in evaluation and management, reassessments, review and interpretation of labs and x-rays, and hemodynamic management, formulating a plan and coordinating care: ___25____ MIN.  Time does not include procedural time.    Ivan Perkins MD  Cardiology    Mobile: [phone]  Office: [phone]  [address] [place] [address], [address]

## 2018-12-09 NOTE — PROGRESS NOTE ADULT - SUBJECTIVE AND OBJECTIVE BOX
HPI:  84 y/o M, former smoker, walks with cane, with PMHx HTN, CAD s/p prior MI (denies history of stents), CHF (on Lasix BID, EF unknown), atrial fibrillation/flutter on Coumadin (last dose 12/4/18), PUD (no history of GI bleeding), recently diagnosed KOKO (does not yet have machine for home), multiple falls in the last 2 months, ?CKD (Cr 1.30 in October 2018), who initially presented to emergency room in [facility] c/o worsening shortness of breath with palpitations. He has been experiencing shortness of breath with chest tightness for the last 2 months. He visited his primary doctor who adjusted his medications which improved his LE edema (patient reports chronic LE edema with blistering) but his SOB persisted. He can only walk < 1 block before he must stop secondary to SOB and chest tightness. He also reports difficulty sleeping/lying flat at night and frequently has only been able to sleep sitting up in a chair recently. He denies n/v, diaphoresis, lightheadedness, PND, or syncope. He had CT Chest/Calcium score performed 10/17/18 showed calcium score 2446; LM 0, , LAD 1094, LCx 615, The CTA with contrast was not performed as it was contraindicated given the extensive coronary calcium score and the irregular heartbeats due to atrial fibrillation. Also of note, CT Chest 10/20/18 showed 3.5x3.1cm infra-renal abdominal aortic aneurysm. Labs from 10/22/18 showed platelets 32. BUN/Cr 25/1.30. In light of patient’s risk factors, CCS Class III-IV angina equivalent symptoms, and extensive coronary calcium score, he is recommended for cardiac catheterization with possible intervention if clinically indicated. (05 Dec 2018 12:01)    FAMILY HISTORY:  Family history of diabetes mellitus (Sibling)    MEDICATIONS  (STANDING):  aspirin enteric coated 81 milliGRAM(s) Oral daily  bisacodyl 5 milliGRAM(s) Oral at bedtime  chlorhexidine 0.12% Liquid 10 milliLiter(s) Swish and Spit once  chlorhexidine 4% Liquid 1 Application(s) Topical once  cyanocobalamin Injectable 1000 MICROGram(s) IntraMuscular daily  diltiazem    milliGRAM(s) Oral daily  folic acid 1 milliGRAM(s) Oral daily  heparin  Infusion. 1200 Unit(s)/Hr (12 mL/Hr) IV Continuous <Continuous>  metoprolol tartrate 12.5 milliGRAM(s) Oral every 12 hours  pantoprazole    Tablet 40 milliGRAM(s) Oral before breakfast    MEDICATIONS  (PRN):    Vital Signs Last 24 Hrs  T(C): 36.2 (09 Dec 2018 13:48), Max: 36.3 (08 Dec 2018 21:42)  T(F): 97.1 (09 Dec 2018 13:48), Max: 97.3 (08 Dec 2018 21:42)  HR: 108 (09 Dec 2018 19:07) (90 - 131)  BP: 124/73 (09 Dec 2018 19:07) (104/82 - 124/73)  BP(mean): --  RR: 17 (09 Dec 2018 19:07) (17 - 18)  SpO2: 96% (09 Dec 2018 19:07) (91% - 98%)    Physical exam:    Overall impression  Lymphadenopathy  Liver  spleen    Labs:  CBC Full  -  ( 09 Dec 2018 13:47 )  WBC Count : 8.0 K/uL  Hemoglobin : 15.8 g/dL  Hematocrit : 49.4 %  Platelet Count - Automated : 82 K/uL  Mean Cell Volume : 88.8 fL  Mean Cell Hemoglobin : 28.4 pg  Mean Cell Hemoglobin Concentration : 32.0 g/dL  Auto Neutrophil # : x  Auto Lymphocyte # : x  Auto Monocyte # : x  Auto Eosinophil # : x  Auto Basophil # : x  Auto Neutrophil % : x  Auto Lymphocyte % : x  Auto Monocyte % : x  Auto Eosinophil % : x  Auto Basophil % : x    12-09    145  |  102  |  29<H>  ----------------------------<  99  4.1   |  33<H>  |  1.44<H>    Ca    10.3      09 Dec 2018 13:48  Mg     2.0     12-09    TPro  6.6  /  Alb  3.7  /  TBili  1.2  /  DBili  x   /  AST  16  /  ALT  22  /  AlkPhos  80  12-08      Radiology:  HEALTH ISSUES - R/O PROBLEM Dx:      Assessmant / Problems  1) stable thrombocytopenia, with normal Hg, platelet count  can continue to use Heparin  will order platelets on hold for tomorrow"s procedure , just in case platelets drop  2) enlarged prostate un US pelvis possible bladder neck obstruction  PSA ordered  Plan:    Thank you  Sonam Sneed MD

## 2018-12-09 NOTE — PROGRESS NOTE ADULT - ATTENDING COMMENTS
Assessment: Patient personally seen and examined myself during rounds with the Physician Assistant/House Staff/Nurse Practitioner  ON DATE 12/9/18  Physician Assistant/House Staff/Nurse Practitioner note read, including vitals, physical findings, laboratory data, and radiological reports.   Revisions included below.   Direct personal management at bed side and extensive interpretation of the data.    Plan was outlined and discussed in details with the Physician Assistant/House Staff/Nurse Practitioner.    Decision making of high complexity   Risk high of complications, morbidity, and/or mortality  Assessment and Action taken for acute disease activity to reflect the level of care provided:  -Hemodynamic evaluation and support  -Medication reconciliation  -Review laboratory data  -EKG reviewed   -    TIME SPENT in evaluation and management, reassessments, review and interpretation of labs and x-rays, and hemodynamic management, formulating a plan and coordinating care: ___25____ MIN.  Time does not include procedural time.    Ivan Perkins MD  Cardiology    Mobile: [phone]  Office: [phone]  [address] [place] [address], [address]

## 2018-12-09 NOTE — PROGRESS NOTE ADULT - PROBLEM SELECTOR PLAN 1
- Likely CKD, less likely RIKY (?CRS), bland u/a  - renal sono w bladder PVR and doppler to eval for CORRINE pending  - send itph, vit D 25 and 1,25, uric acid   - Cr 1.4 today - stable   -  may be 2/2 KIA however suspect 2/2 diuresis given rise in CO2   - Recommend to hold diuretics for now   - Hold ACE
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
## 2018-12-09 NOTE — PROGRESS NOTE ADULT - ASSESSMENT
84 yo M w/ HTN, CAD s/p prior MI, CHF, atrial fibrillation/flutter on Coumadin, PUD, recently diagnosed KOKO adm for decompensated HF s/p cath 12/6 pending CABG Mon with likely new dx of CKD III.

## 2018-12-09 NOTE — PROGRESS NOTE ADULT - SUBJECTIVE AND OBJECTIVE BOX
S: Pt seen and examined bedside.  Patient denies C/P, SOB, N/V, dizziness, palpitations, and diaphoresis.  Pt denies fever/chills, dysuria, abdominal pain, diarrhea, and cough  12 Point ROS otherwise negative except as per HPI/subjective.     O: Vital Signs Last 24 Hrs  T(C): 36.2 (09 Dec 2018 13:48), Max: 36.6 (08 Dec 2018 19:32)  T(F): 97.1 (09 Dec 2018 13:48), Max: 97.8 (08 Dec 2018 19:32)  HR: 99 (09 Dec 2018 13:00) (98 - 131)  BP: 120/77 (09 Dec 2018 13:00) (105/71 - 122/77)  RR: 17 (09 Dec 2018 13:00) (17 - 18)  SpO2: 95% (09 Dec 2018 13:00) (92% - 98%) on 4L NC    PHYSICAL EXAM:  GEN: NAD  PULM:  Decreased BS B/L Bases  CARD: Decreased JVD, Irreg Irreg, S1 and S2   ABD: +BS, NT, soft/ND	  EXT: No Edema B/L LE, Venous Stasis Change B/L LE  R WRIST: soft, no hematoma, no bleeding, radial pulse 2+  NEURO: A+Ox3, no focal deficit    LABS:                   15.8   8.0   )-----------( 82       ( 09 Dec 2018 13:47 )             49.4       145  |  103  |  32<H>  ----------------------------<  98  3.9   |  34<H>  |  1.56<H>    Ca    10.0      09 Dec 2018 05:47  Mg     2.0         TPro  6.6  /  Alb  3.7  /  TBili  1.2  /  DBili  x   /  AST  16  /  ALT  22  /  AlkPhos  80      PT/INR - ( 09 Dec 2018 05:47 )   PT: 21.4 sec;   INR: 1.86    PTT - ( 09 Dec 2018 05:47 )  PTT:173.2 sec     @ 07:01  -   @ 07:00  --------------------------------------------------------  IN: 420 mL / OUT: 850 mL / NET: -430 mL    Daily Weight in k.7 (09 Dec 2018 06:31)    S/p R and L cardiac cath 18: pLAD 100%  R-L collaterals, LCX 30%, mRCA 100% , PCWP 19, PAP 22.     CT Chest 18: trace B/L effusions atelectasis (verbal radiology read)    Carotid US: no hemodynamically significant stenosis     Renal US: consistent w/ medical renal disease and renal cysts

## 2018-12-09 NOTE — PROGRESS NOTE ADULT - PROBLEM SELECTOR PLAN 4
Renal Dr. Lion following   Cr. 1.56. Stable. Baseline 1.3 in 10/2018.   No acute findings on Renal US  F/u PVR  Repeat BMP 1PM per CT surgery   Diuresis held today pending renal recs   PTT supratherapeutic and at time mild hematuria per RN (no sample seen)  Currently w/o hematuria. CT surgery aware Renal Dr. Lion following   Cr. 1.56. Repeat improved 1.44. Baseline 1.3 in 10/2018.   No acute findings on Renal US  F/u Bladder US  PTT supratherapeutic and at time mild hematuria per RN (no sample seen)  Currently w/o hematuria. CT surgery aware  F/u Urine cytology per Renal   30K-80K CFU Enterococcus on UCX 12/7/18: 1 dose Levaquin 750 PO per CT surgery / Renal agrees Renal Dr. Lion following   Cr. 1.56. Repeat improved 1.44. Baseline 1.3 in 10/2018.   No acute findings on Renal US  F/u Bladder US  PTT supratherapeutic and at time mild hematuria per RN (no sample seen)  Currently w/o hematuria. CT surgery aware  F/u Urine cytology per Renal   30K-80K CFU Enterococcus on UCX 12/7/18: 1 dose Levaquin 750 PO per CT surgery / Renal agrees  Renal Artery Doppler no longer needed per Renal  F/u PTH, Vitamin D, Uric Acid

## 2018-12-09 NOTE — PROGRESS NOTE ADULT - SUBJECTIVE AND OBJECTIVE BOX
HPI: 86 yo M w/ HTN, CAD s/p prior MI, CHF, atrial fibrillation/flutter on Coumadin, PUD, recently diagnosed KOKO adm for decompensated HF s/p cath 12/6 pending CABG 12/10 with likely new dx of CKD III.    Patient was seen and examined at bedside. He had no complaints.    Meds:    aspirin enteric coated 81 milliGRAM(s) Oral daily  bisacodyl 5 milliGRAM(s) Oral at bedtime  chlorhexidine 0.12% Liquid 10 milliLiter(s) Swish and Spit once  chlorhexidine 4% Liquid 1 Application(s) Topical once  chlorhexidine 4% Liquid 1 Application(s) Topical once  cyanocobalamin Injectable 1000 MICROGram(s) IntraMuscular daily  diltiazem    milliGRAM(s) Oral daily  folic acid 1 milliGRAM(s) Oral daily  heparin  Infusion. 1200 Unit(s)/Hr IV Continuous <Continuous>  metoprolol tartrate 12.5 milliGRAM(s) Oral every 12 hours  pantoprazole    Tablet 40 milliGRAM(s) Oral before breakfast      T(C): 36.2 (12-09-18 @ 13:48), Max: 36.6 (12-08-18 @ 19:32)  HR: 90 (12-09-18 @ 16:33) (90 - 131)  BP: 104/82 (12-09-18 @ 16:33) (104/82 - 122/77)  RR: 17 (12-09-18 @ 16:33) (17 - 18)  SpO2: 95% (12-09-18 @ 16:33) (91% - 98%)    Input/Output      12-08-18 @ 07:01  -  12-09-18 @ 07:00  --------------------------------------------------------  IN: 420 mL / OUT: 850 mL / NET: -430 mL    12-09-18 @ 07:01  -  12-09-18 @ 18:59  --------------------------------------------------------  IN: 0 mL / OUT: 420 mL / NET: -420 mL      Weight (kg): 104.5 (12-09-18 @ 09:16)    ROS:     Gen: no fever  Cardiovascular: no chest pain  Respiratory: no cough, no sputum  Gastrointestinal: no nausea, no vomiting, no change in bowel habits  Neurologic: no headache  : no urinary symptoms        PHYSICAL EXAM:    elderly man sitting up in bed, nad  HEENT: normocephalic/atraumatic, no conjunctival pallor, no scleral icterus, no mucosal bleeding  Neck: supple, no masses, + JVD  Cardiovascular: RR, nl S1S2, no murmurs/rubs/gallops  Respiratory: no respiratory distress, decreased bibasilar breath sounds, no rhonchi or wheezing  Gastrointestinal: BS+, soft, NT/ND, no masses  Extremities: no clubbing/cyanosis, 1+edema, no calf tenderness, + chronic venous stasis dermatitis  AAox3      Labs:                                   15.8   8.0   )-----------( 82       ( 09 Dec 2018 13:47 )             49.4       12-09    145  |  102  |  29<H>  ----------------------------<  99  4.1   |  33<H>  |  1.44<H>    Ca    10.3      09 Dec 2018 13:48  Mg     2.0     12-09    TPro  6.6  /  Alb  3.7  /  TBili  1.2  /  DBili  x   /  AST  16  /  ALT  22  /  AlkPhos  80  12-08      PT/INR - ( 09 Dec 2018 05:47 )   PT: 21.4 sec;   INR: 1.86          PTT - ( 09 Dec 2018 13:48 )  PTT:164.3 sec

## 2018-12-09 NOTE — PROGRESS NOTE ADULT - PROBLEM SELECTOR PLAN 2
Severe 2vCAD  For midCABG 12/10/18. NPO after MN  Pre-op work-up complete   Continue ASA 81mg daily  Statin on hold per Heme  Continue CCB Severe 2vCAD  For midCABG 12/10/18. NPO after MN  Pre-op work-up complete   Continue ASA 81mg daily  Statin on hold per Heme  Continue CCB  Start BB

## 2018-12-09 NOTE — PROGRESS NOTE ADULT - ASSESSMENT
84 y/o M, former smoker with PMHx HTN, CAD s/p prior MI (denies history of stents), CHF, atrial fibrillation/flutter on Coumadin (last dose 12/4/18), PUD (no history of GI bleeding), recently diagnosed KOKO, CKD, and AAA, s/p L and R cath, for CABG tomorrow, doing well on CPAP.

## 2018-12-09 NOTE — PROGRESS NOTE ADULT - PROBLEM SELECTOR PLAN 3
Continue Cardizem CD 180mg daily  HR 100s at rest. HR as high as 120s w/ movement  Continue current dose CCB.   INR 1.8 on heparin gtt Continue Cardizem CD 180mg daily  HR 100s at rest. HR as high as 120s w/ movement  Continue current dose CCB.   Start BB  INR 1.8 on heparin gtt

## 2018-12-09 NOTE — PROGRESS NOTE ADULT - ASSESSMENT
86 y/o M, Ex-Smoker, Ambulates w/ Cane, PMHX HTN, Afib, CAD s/p prior MI s/p prior PCI, Chronic Diastolic CHF (EF 50%, moderate LVH), CKD stage 3 s/p diagnostic cardiac cath 12/6/18 revealing severe 2vCAD, for midCABG on 12/10/18, renal following for CKD stage 3, pulm following for KOKO, heme following for thrombocytopenia, supratherapeutic INR on admission.

## 2018-12-09 NOTE — PROGRESS NOTE ADULT - PROBLEM SELECTOR PLAN 6
Continue CPAP at night  Pulm following  Repeat CT chest post-op    DVT PPX: Heparin gtt    Dispo: for midCABG on 12/10/18 Continue CPAP at night  Pulm following

## 2018-12-09 NOTE — PROGRESS NOTE ADULT - PROBLEM SELECTOR PLAN 1
Ordered for Lasix 20mg IV daily  Diuretics held today pending renal recs  ACEI/ARB on hold pre-op CT surgery D/C IV Lasix pre-op as discussed w/ Renal  ACEI/ARB on hold pre-op CT surgery D/C IV Lasix pre-op as discussed w/ Renal  ACEI/ARB on hold pre-op CT surgery  Start Lopressor 12.5mg BID

## 2018-12-09 NOTE — PROGRESS NOTE ADULT - PROBLEM SELECTOR PLAN 5
Heme Dr. Sneed  Platelet count stable. 81.  Repeat CBC 1PM per Heme  Hold Finasteride / Atorvastatin per Heme Heme Dr. Sneed  Platelet count stable. 81. Repeat 82  Hold Finasteride / Atorvastatin per Heme

## 2018-12-10 ENCOUNTER — APPOINTMENT (OUTPATIENT)
Dept: CARDIOTHORACIC SURGERY | Facility: HOSPITAL | Age: 83
End: 2018-12-10

## 2018-12-10 DIAGNOSIS — Z95.1 PRESENCE OF AORTOCORONARY BYPASS GRAFT: ICD-10-CM

## 2018-12-10 DIAGNOSIS — Z09 ENCOUNTER FOR FOLLOW-UP EXAMINATION AFTER COMPLETED TREATMENT FOR CONDITIONS OTHER THAN MALIGNANT NEOPLASM: ICD-10-CM

## 2018-12-10 DIAGNOSIS — I25.10 ATHEROSCLEROTIC HEART DISEASE OF NATIVE CORONARY ARTERY W/OUT ANGINA PECTORIS: ICD-10-CM

## 2018-12-10 LAB
24R-OH-CALCIDIOL SERPL-MCNC: 27.9 NG/ML — LOW (ref 30–80)
ALBUMIN SERPL ELPH-MCNC: 3 G/DL — LOW (ref 3.3–5)
ALBUMIN SERPL ELPH-MCNC: 3.6 G/DL — SIGNIFICANT CHANGE UP (ref 3.3–5)
ALP SERPL-CCNC: 81 U/L — SIGNIFICANT CHANGE UP (ref 40–120)
ALP SERPL-CCNC: 81 U/L — SIGNIFICANT CHANGE UP (ref 40–120)
ALT FLD-CCNC: 18 U/L — SIGNIFICANT CHANGE UP (ref 10–45)
ALT FLD-CCNC: 21 U/L — SIGNIFICANT CHANGE UP (ref 10–45)
ANION GAP SERPL CALC-SCNC: 13 MMOL/L — SIGNIFICANT CHANGE UP (ref 5–17)
ANION GAP SERPL CALC-SCNC: 8 MMOL/L — SIGNIFICANT CHANGE UP (ref 5–17)
APTT BLD: 27.5 SEC — SIGNIFICANT CHANGE UP (ref 27.5–36.3)
APTT BLD: 62.6 SEC — HIGH (ref 27.5–36.3)
APTT BLD: 84.6 SEC — HIGH (ref 27.5–36.3)
AST SERPL-CCNC: 16 U/L — SIGNIFICANT CHANGE UP (ref 10–40)
AST SERPL-CCNC: 17 U/L — SIGNIFICANT CHANGE UP (ref 10–40)
BASOPHILS NFR BLD AUTO: 0.2 % — SIGNIFICANT CHANGE UP (ref 0–2)
BILIRUB SERPL-MCNC: 1.4 MG/DL — HIGH (ref 0.2–1.2)
BILIRUB SERPL-MCNC: 1.4 MG/DL — HIGH (ref 0.2–1.2)
BUN SERPL-MCNC: 24 MG/DL — HIGH (ref 7–23)
BUN SERPL-MCNC: 26 MG/DL — HIGH (ref 7–23)
CALCIUM SERPL-MCNC: 10 MG/DL — SIGNIFICANT CHANGE UP (ref 8.4–10.5)
CALCIUM SERPL-MCNC: 10.4 MG/DL — SIGNIFICANT CHANGE UP (ref 8.4–10.5)
CALCIUM SERPL-MCNC: 10.5 MG/DL — SIGNIFICANT CHANGE UP (ref 8.4–10.5)
CHLORIDE SERPL-SCNC: 102 MMOL/L — SIGNIFICANT CHANGE UP (ref 96–108)
CHLORIDE SERPL-SCNC: 98 MMOL/L — SIGNIFICANT CHANGE UP (ref 96–108)
CO2 SERPL-SCNC: 27 MMOL/L — SIGNIFICANT CHANGE UP (ref 22–31)
CO2 SERPL-SCNC: 33 MMOL/L — HIGH (ref 22–31)
CREAT SERPL-MCNC: 1.18 MG/DL — SIGNIFICANT CHANGE UP (ref 0.5–1.3)
CREAT SERPL-MCNC: 1.36 MG/DL — HIGH (ref 0.5–1.3)
EOSINOPHIL NFR BLD AUTO: 0.4 % — SIGNIFICANT CHANGE UP (ref 0–6)
GAS PNL BLDA: SIGNIFICANT CHANGE UP
GAS PNL BLDA: SIGNIFICANT CHANGE UP
GLUCOSE SERPL-MCNC: 137 MG/DL — HIGH (ref 70–99)
GLUCOSE SERPL-MCNC: 82 MG/DL — SIGNIFICANT CHANGE UP (ref 70–99)
HCT VFR BLD CALC: 45 % — SIGNIFICANT CHANGE UP (ref 39–50)
HCT VFR BLD CALC: 50.6 % — HIGH (ref 39–50)
HGB BLD-MCNC: 14.6 G/DL — SIGNIFICANT CHANGE UP (ref 13–17)
HGB BLD-MCNC: 16 G/DL — SIGNIFICANT CHANGE UP (ref 13–17)
INR BLD: 1.5 — HIGH (ref 0.88–1.16)
INR BLD: 1.52 — HIGH (ref 0.88–1.16)
LYMPHOCYTES # BLD AUTO: 4.4 % — LOW (ref 13–44)
MAGNESIUM SERPL-MCNC: 2.1 MG/DL — SIGNIFICANT CHANGE UP (ref 1.6–2.6)
MCHC RBC-ENTMCNC: 28.3 PG — SIGNIFICANT CHANGE UP (ref 27–34)
MCHC RBC-ENTMCNC: 28.5 PG — SIGNIFICANT CHANGE UP (ref 27–34)
MCHC RBC-ENTMCNC: 31.6 G/DL — LOW (ref 32–36)
MCHC RBC-ENTMCNC: 32.4 G/DL — SIGNIFICANT CHANGE UP (ref 32–36)
MCV RBC AUTO: 87.7 FL — SIGNIFICANT CHANGE UP (ref 80–100)
MCV RBC AUTO: 89.4 FL — SIGNIFICANT CHANGE UP (ref 80–100)
MONOCYTES NFR BLD AUTO: 7.7 % — SIGNIFICANT CHANGE UP (ref 2–14)
NEUTROPHILS NFR BLD AUTO: 87.3 % — HIGH (ref 43–77)
PLATELET # BLD AUTO: 78 K/UL — LOW (ref 150–400)
PLATELET # BLD AUTO: 80 K/UL — LOW (ref 150–400)
POTASSIUM SERPL-MCNC: 4.3 MMOL/L — SIGNIFICANT CHANGE UP (ref 3.5–5.3)
POTASSIUM SERPL-MCNC: 4.5 MMOL/L — SIGNIFICANT CHANGE UP (ref 3.5–5.3)
POTASSIUM SERPL-SCNC: 4.3 MMOL/L — SIGNIFICANT CHANGE UP (ref 3.5–5.3)
POTASSIUM SERPL-SCNC: 4.5 MMOL/L — SIGNIFICANT CHANGE UP (ref 3.5–5.3)
PROT SERPL-MCNC: 5.7 G/DL — LOW (ref 6–8.3)
PROT SERPL-MCNC: 6.7 G/DL — SIGNIFICANT CHANGE UP (ref 6–8.3)
PROTHROM AB SERPL-ACNC: 17.2 SEC — HIGH (ref 10–12.9)
PROTHROM AB SERPL-ACNC: 17.4 SEC — HIGH (ref 10–12.9)
PSA FLD-MCNC: 1.99 NG/ML — SIGNIFICANT CHANGE UP (ref 0–4)
PTH-INTACT FLD-MCNC: 176 PG/ML — HIGH (ref 15–65)
RBC # BLD: 5.13 M/UL — SIGNIFICANT CHANGE UP (ref 4.2–5.8)
RBC # BLD: 5.66 M/UL — SIGNIFICANT CHANGE UP (ref 4.2–5.8)
RBC # FLD: 13.8 % — SIGNIFICANT CHANGE UP (ref 10.3–16.9)
RBC # FLD: 14.1 % — SIGNIFICANT CHANGE UP (ref 10.3–16.9)
SODIUM SERPL-SCNC: 138 MMOL/L — SIGNIFICANT CHANGE UP (ref 135–145)
SODIUM SERPL-SCNC: 143 MMOL/L — SIGNIFICANT CHANGE UP (ref 135–145)
URATE SERPL-MCNC: 7.9 MG/DL — SIGNIFICANT CHANGE UP (ref 3.4–8.8)
VIT D25+D1,25 OH+D1,25 PNL SERPL-MCNC: 38.3 PG/ML — SIGNIFICANT CHANGE UP (ref 19.9–79.3)
WBC # BLD: 10.4 K/UL — SIGNIFICANT CHANGE UP (ref 3.8–10.5)
WBC # BLD: 5.6 K/UL — SIGNIFICANT CHANGE UP (ref 3.8–10.5)
WBC # FLD AUTO: 10.4 K/UL — SIGNIFICANT CHANGE UP (ref 3.8–10.5)
WBC # FLD AUTO: 5.6 K/UL — SIGNIFICANT CHANGE UP (ref 3.8–10.5)

## 2018-12-10 PROCEDURE — 71045 X-RAY EXAM CHEST 1 VIEW: CPT | Mod: 26

## 2018-12-10 PROCEDURE — 33533 CABG ARTERIAL SINGLE: CPT | Mod: AS

## 2018-12-10 PROCEDURE — 99233 SBSQ HOSP IP/OBS HIGH 50: CPT

## 2018-12-10 PROCEDURE — 76998 US GUIDE INTRAOP: CPT | Mod: 26,AS

## 2018-12-10 PROCEDURE — 94010 BREATHING CAPACITY TEST: CPT | Mod: 26

## 2018-12-10 PROCEDURE — 33533 CABG ARTERIAL SINGLE: CPT

## 2018-12-10 PROCEDURE — 76998 US GUIDE INTRAOP: CPT | Mod: 26,59

## 2018-12-10 PROCEDURE — 99291 CRITICAL CARE FIRST HOUR: CPT

## 2018-12-10 PROCEDURE — 93010 ELECTROCARDIOGRAM REPORT: CPT

## 2018-12-10 RX ORDER — HEPARIN SODIUM 5000 [USP'U]/ML
5000 INJECTION INTRAVENOUS; SUBCUTANEOUS EVERY 8 HOURS
Qty: 0 | Refills: 0 | Status: DISCONTINUED | OUTPATIENT
Start: 2018-12-10 | End: 2018-12-15

## 2018-12-10 RX ORDER — CEFAZOLIN SODIUM 1 G
2000 VIAL (EA) INJECTION EVERY 8 HOURS
Qty: 0 | Refills: 0 | Status: COMPLETED | OUTPATIENT
Start: 2018-12-10 | End: 2018-12-12

## 2018-12-10 RX ORDER — SODIUM CHLORIDE 9 MG/ML
1000 INJECTION INTRAMUSCULAR; INTRAVENOUS; SUBCUTANEOUS
Qty: 0 | Refills: 0 | Status: DISCONTINUED | OUTPATIENT
Start: 2018-12-10 | End: 2018-12-13

## 2018-12-10 RX ORDER — DIGOXIN 250 MCG
0.25 TABLET ORAL ONCE
Qty: 0 | Refills: 0 | Status: DISCONTINUED | OUTPATIENT
Start: 2018-12-10 | End: 2018-12-10

## 2018-12-10 RX ORDER — CHLORHEXIDINE GLUCONATE 213 G/1000ML
5 SOLUTION TOPICAL EVERY 4 HOURS
Qty: 0 | Refills: 0 | Status: DISCONTINUED | OUTPATIENT
Start: 2018-12-10 | End: 2018-12-11

## 2018-12-10 RX ORDER — ASPIRIN/CALCIUM CARB/MAGNESIUM 324 MG
81 TABLET ORAL DAILY
Qty: 0 | Refills: 0 | Status: DISCONTINUED | OUTPATIENT
Start: 2018-12-10 | End: 2018-12-15

## 2018-12-10 RX ORDER — CLOPIDOGREL BISULFATE 75 MG/1
75 TABLET, FILM COATED ORAL DAILY
Qty: 0 | Refills: 0 | Status: DISCONTINUED | OUTPATIENT
Start: 2018-12-10 | End: 2018-12-15

## 2018-12-10 RX ORDER — CHLORHEXIDINE GLUCONATE 213 G/1000ML
1 SOLUTION TOPICAL DAILY
Qty: 0 | Refills: 0 | Status: DISCONTINUED | OUTPATIENT
Start: 2018-12-10 | End: 2018-12-13

## 2018-12-10 RX ORDER — FAMOTIDINE 10 MG/ML
20 INJECTION INTRAVENOUS EVERY 12 HOURS
Qty: 0 | Refills: 0 | Status: DISCONTINUED | OUTPATIENT
Start: 2018-12-10 | End: 2018-12-11

## 2018-12-10 RX ORDER — INSULIN HUMAN 100 [IU]/ML
1 INJECTION, SOLUTION SUBCUTANEOUS
Qty: 100 | Refills: 0 | Status: DISCONTINUED | OUTPATIENT
Start: 2018-12-10 | End: 2018-12-11

## 2018-12-10 RX ORDER — DEXTROSE 50 % IN WATER 50 %
25 SYRINGE (ML) INTRAVENOUS
Qty: 0 | Refills: 0 | Status: DISCONTINUED | OUTPATIENT
Start: 2018-12-10 | End: 2018-12-11

## 2018-12-10 RX ORDER — NOREPINEPHRINE BITARTRATE/D5W 8 MG/250ML
0.05 PLASTIC BAG, INJECTION (ML) INTRAVENOUS
Qty: 8 | Refills: 0 | Status: DISCONTINUED | OUTPATIENT
Start: 2018-12-10 | End: 2018-12-11

## 2018-12-10 RX ORDER — ACETAMINOPHEN 500 MG
1000 TABLET ORAL ONCE
Qty: 0 | Refills: 0 | Status: COMPLETED | OUTPATIENT
Start: 2018-12-10 | End: 2018-12-10

## 2018-12-10 RX ORDER — DIGOXIN 250 MCG
0.25 TABLET ORAL ONCE
Qty: 0 | Refills: 0 | Status: COMPLETED | OUTPATIENT
Start: 2018-12-10 | End: 2018-12-10

## 2018-12-10 RX ORDER — METOPROLOL TARTRATE 50 MG
2.5 TABLET ORAL
Qty: 0 | Refills: 0 | Status: COMPLETED | OUTPATIENT
Start: 2018-12-10 | End: 2018-12-10

## 2018-12-10 RX ORDER — DEXTROSE 50 % IN WATER 50 %
50 SYRINGE (ML) INTRAVENOUS
Qty: 0 | Refills: 0 | Status: DISCONTINUED | OUTPATIENT
Start: 2018-12-10 | End: 2018-12-11

## 2018-12-10 RX ORDER — BUPIVACAINE 13.3 MG/ML
20 INJECTION, SUSPENSION, LIPOSOMAL INFILTRATION ONCE
Qty: 0 | Refills: 0 | Status: DISCONTINUED | OUTPATIENT
Start: 2018-12-10 | End: 2018-12-11

## 2018-12-10 RX ADMIN — HEPARIN SODIUM 1000 UNIT(S)/HR: 5000 INJECTION INTRAVENOUS; SUBCUTANEOUS at 08:22

## 2018-12-10 RX ADMIN — Medication 12.5 MILLIGRAM(S): at 06:36

## 2018-12-10 RX ADMIN — CHLORHEXIDINE GLUCONATE 10 MILLILITER(S): 213 SOLUTION TOPICAL at 07:05

## 2018-12-10 RX ADMIN — Medication 180 MILLIGRAM(S): at 06:36

## 2018-12-10 RX ADMIN — CHLORHEXIDINE GLUCONATE 1 APPLICATION(S): 213 SOLUTION TOPICAL at 06:14

## 2018-12-10 RX ADMIN — Medication 204 MILLIGRAM(S): at 22:52

## 2018-12-10 RX ADMIN — Medication 400 MILLIGRAM(S): at 23:48

## 2018-12-10 RX ADMIN — Medication 1 MILLIGRAM(S): at 13:54

## 2018-12-10 RX ADMIN — CHLORHEXIDINE GLUCONATE 5 MILLILITER(S): 213 SOLUTION TOPICAL at 22:01

## 2018-12-10 RX ADMIN — HEPARIN SODIUM 0 UNIT(S)/HR: 5000 INJECTION INTRAVENOUS; SUBCUTANEOUS at 07:35

## 2018-12-10 RX ADMIN — Medication 81 MILLIGRAM(S): at 13:54

## 2018-12-10 RX ADMIN — CLOPIDOGREL BISULFATE 75 MILLIGRAM(S): 75 TABLET, FILM COATED ORAL at 22:01

## 2018-12-10 RX ADMIN — Medication 2.5 MILLIGRAM(S): at 21:28

## 2018-12-10 RX ADMIN — CHLORHEXIDINE GLUCONATE 1 APPLICATION(S): 213 SOLUTION TOPICAL at 22:01

## 2018-12-10 RX ADMIN — Medication 100 MILLIGRAM(S): at 22:02

## 2018-12-10 RX ADMIN — HEPARIN SODIUM 1000 UNIT(S)/HR: 5000 INJECTION INTRAVENOUS; SUBCUTANEOUS at 15:07

## 2018-12-10 RX ADMIN — PREGABALIN 1000 MICROGRAM(S): 225 CAPSULE ORAL at 13:53

## 2018-12-10 NOTE — PROGRESS NOTE ADULT - PROBLEM SELECTOR PLAN 2
Severe 2vCAD  For midCABG 12/10/18. NPO   Pre-op work-up complete   Continue ASA 81mg daily  Statin on hold per Heme  Continue CCB  Start BB. Severe 2vCAD  For midCABG 12/10/18. NPO   Pre-op work-up complete   Continue ASA 81mg daily  Statin on hold per Heme  Continue CCB  Continue BB.

## 2018-12-10 NOTE — PROGRESS NOTE ADULT - PROBLEM SELECTOR PLAN 1
Cr improving.  1.36 today.  Last lasix dose on 10/8 at 5AM.  PCR 0.1.  Renal sono w/ increase echogenicity of kidneys, marked prostatomegaly with increased bladder wall thickness. Cr improving after diuresis held, 1.36 today.  Last lasix dose on 10/8 at 5AM.  PCR 0.1.  Renal sono w/ increase echogenicity of kidneys, marked prostatomegaly with increased bladder wall thickness. Uric acid WNL.   -iPTH, vit D 25 and 1,25 pending  -continue to hold ACEi Cr improving after diuresis held, 1.36 today.  Last lasix dose on 10/8 at 5AM.  PCR 0.1.  Renal sono w/ increase echogenicity of kidneys, marked prostatomegaly with increased bladder wall thickness. Uric acid WNL.   -iPTH, vit D 25 and 1,25 pending  -continue to hold ACEi  -If Cr rises again, please check post void residual as pt w/ significant BPH  -Pt will need to f/u w/ nephrology and urology when returning to [facility] Cr improving after diuresis held, 1.36 today.  Last lasix dose on 10/8 at 5AM.  PCR 0.1.  Renal sono w/ increase echogenicity of kidneys, marked prostatomegaly with increased bladder wall thickness c/f cystitis, pt s/p 1 dose of levaquin on 12/9. Uric acid WNL.   -iPTH, vit D 25 and 1,25 pending  -continue to hold ACEi  -If Cr rises again, please check post void residual as pt w/ significant BPH  -Pt will need to f/u w/ nephrology and urology when returning to [facility]

## 2018-12-10 NOTE — DIETITIAN INITIAL EVALUATION ADULT. - ENERGY NEEDS
Height: 5'11" Weight: 230 lbs? 225 lbs (12/7), 220.2 lbs (12/8), 222 lbs (12/9), 223.1 lbs (12/10), Unknown Dry Wt,  lbs+/-10%, %%, BMI 32.1,  IBW used to calculate EER as per pt's current body weight >120% of IBW  Estimated nutrient needs based on [facility] SOC for maintenance in older adults adjusted for pre-op CABG  FR 1000 mL/d per team 2/2 CHF

## 2018-12-10 NOTE — PROGRESS NOTE ADULT - PROBLEM SELECTOR PLAN 4
Renal Dr. Lion following   Cr. 1.36. Baseline 1.3 in 10/2018.   Bladder US showed bladder wall thickening Renal aware. PVR 90cc  Mild hematuria on 12/9/18 in setting of supratherapeutic PTT   No further hematuria. Renal aware   F/u Urine cytology   30K-80K CFU Enterococcus on UCX 12/7/18: s/p 1 dose Levaquin 750 PO yesterday

## 2018-12-10 NOTE — DIETITIAN INITIAL EVALUATION ADULT. - OTHER INFO
85 y.o M former smoker from home with PMHx of HTN, CAD h/o MI, CHF, Afib/Flutter on Coumadin, PUD, KOKO, CKD. Pt admitted with cardiac cath interventions, underwent on 12/6. Plan for CABG today 12/10. Of note, EF 50%. Pt's family cooks for pt, tries to follow general healthy eating habits (uses coconut oil, plenty of vegetables per family), admits to needing more nutrition education, good appetite, NKFA, unknown dry weight. Pt takes Lipitor, Bisacodyl, Lasix, Coumadin per home meds. Pt monitor food intake of vitamin K. Pt is previously tolerating DASH/TLC diet, FR 1000 mL/d well with good >75% PO intake of meals. Denies N/V/D/C or pain. +BM 12/9 (on Dulcolax). Nutrition edu provided to pt and family in depth with handouts. RD will f/u per protocol.

## 2018-12-10 NOTE — DIETITIAN INITIAL EVALUATION ADULT. - NS AS NUTRI INTERV ED CONTENT
DASH/TLC diet therapy provided to pt and family with handouts, FDI of Coumadin with Vitamin K diet therapy provided as well.

## 2018-12-10 NOTE — PROGRESS NOTE ADULT - SUBJECTIVE AND OBJECTIVE BOX
Subjective: Patient seen and examined at bedside. Pt pending CABG today.  ROS negative for SOB, LEDESMA, orthopnea or worsening LE edema.  Denies dysuria or inability to fully void.      [OBJECTIVE]:    Vital Signs:  T(F): 97 (12-10-18 @ 09:00), Max: 97.7 (12-09-18 @ 23:57)  HR: 83 (12-10-18 @ 09:10) (83 - 108)  BP: 124/74 (12-10-18 @ 08:54) (104/82 - 131/79)  BP(mean): --  RR: 18 (12-10-18 @ 08:54) (16 - 18)  SpO2: 97% (12-10-18 @ 09:10) (91% - 98%)  Wt(kg): --  CVP(cm H2O): --      12-09 @ 07:01  -  12-10 @ 07:00  --------------------------------------------------------  IN: 324 mL / OUT: 570 mL / NET: -246 mL    12-10 @ 07:01  -  12-10 @ 10:29  --------------------------------------------------------  IN: 0 mL / OUT: 0 mL / NET: 0 mL      CAPILLARY BLOOD GLUCOSE      POCT Blood Glucose.: 127 mg/dL (08 Dec 2018 10:54)      Physical Exam:  GEN: elderly man lying flat in bed, nad  HEENT: normocephalic/atraumatic, no conjunctival pallor, no scleral icterus, no mucosal bleeding  Neck: supple, no masses, + JVD  Cardiovascular: RR, nl S1S2, no murmurs/rubs/gallops  Respiratory: no respiratory distress, decreased bibasilar breath sounds, no rhonchi or wheezing  Gastrointestinal: BS+, soft, NT/ND, no masses  Extremities: no clubbing/cyanosis, 1+edema, no calf tenderness, + chronic venous stasis dermatitis  AAox3    Medications:  MEDICATIONS  (STANDING):  aspirin enteric coated 81 milliGRAM(s) Oral daily  bisacodyl 5 milliGRAM(s) Oral at bedtime  cyanocobalamin Injectable 1000 MICROGram(s) IntraMuscular daily  diltiazem    milliGRAM(s) Oral daily  folic acid 1 milliGRAM(s) Oral daily  heparin  Infusion. 1200 Unit(s)/Hr (12 mL/Hr) IV Continuous <Continuous>  metoprolol tartrate 12.5 milliGRAM(s) Oral every 12 hours  pantoprazole    Tablet 40 milliGRAM(s) Oral before breakfast    MEDICATIONS  (PRN):  heparin  Injectable 6000 Unit(s) IV Push every 6 hours PRN For aPTT less than 40      Allergies    penicillin (Swelling)    Intolerances        Labs:                        16.0   5.6   )-----------( 80       ( 10 Dec 2018 06:54 )             50.6     12-10    143  |  102  |  26<H>  ----------------------------<  82  4.3   |  33<H>  |  1.36<H>    Ca    10.5      10 Dec 2018 06:54  Mg     2.1     12-10    TPro  6.7  /  Alb  3.6  /  TBili  1.4<H>  /  DBili  x   /  AST  17  /  ALT  21  /  AlkPhos  81  12-10    PT/INR - ( 10 Dec 2018 06:54 )   PT: 17.2 sec;   INR: 1.50          PTT - ( 10 Dec 2018 06:54 )  PTT:84.6 sec

## 2018-12-10 NOTE — PROGRESS NOTE ADULT - PROBLEM SELECTOR PLAN 5
Heme Dr. Sneed  Platelet count stable. 80  Hold Finasteride / Atorvastatin per Heme.   Platelets on hold for OR
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
## 2018-12-10 NOTE — BRIEF OPERATIVE NOTE - PROCEDURE
<<-----Click on this checkbox to enter Procedure CABG  12/10/2018  LIMA - LAD, Robotically assisted  Active  [email or web address]

## 2018-12-10 NOTE — PROGRESS NOTE ADULT - ATTENDING COMMENTS
transient hematuria, bladder sono w thickened bladder wall and BPH, needs f/u with  as outpt, mild clinically insignificant urinary retention. If Cr trends up would check PVR, in the setting of enterococcus UTI which may account for bladder wall thickeness. Still req 4Lnc however lung exam and CXR clear, agree w holding diuresis. CABG today, holding lisinopril. 1Kg down net since admission

## 2018-12-10 NOTE — PROGRESS NOTE ADULT - PROBLEM SELECTOR PLAN 3
Previously unable to calculate post void residual as pt unable to void, however, pre void w/ 137 cc.  US significant for marked prostatomegaly.  Pt denies difficulty w/ urination.  - consider addition of tamsulosin when clinically appropriate    PENDING ATTENDING RECOMMENDATIONS Previously unable to calculate post void residual as pt unable to void, however, pre void w/ 137 cc.  US significant for marked prostatomegaly.  Pt denies difficulty w/ urination.  - consider addition of tamsulosin when clinically appropriate

## 2018-12-10 NOTE — PROGRESS NOTE ADULT - PROBLEM SELECTOR PLAN 1
S/p IV diuresis. Last dose of Lasix 12/8/18  No further Lasix pre-op  ACEI/ARB on hold pre-op CT surgery  Lopressor 12.5mg BID.

## 2018-12-10 NOTE — PROGRESS NOTE ADULT - ASSESSMENT
86 yo M w/ PMHx of HTN, CAD, KOKO, afib on coumadin, PUD, for CABG today after cath on 12/6 revealed extensive disease, nephrology consulted for CKD.

## 2018-12-10 NOTE — PROGRESS NOTE ADULT - PROBLEM SELECTOR PROBLEM 4
HTN (hypertension)
Chronic kidney disease (CKD), stage III (moderate)
HTN (hypertension)
Chronic kidney disease (CKD), stage III (moderate)

## 2018-12-10 NOTE — PROGRESS NOTE ADULT - ASSESSMENT
86 y/o M, Ex-Smoker, Ambulates w/ Cane, PMHX HTN, Afib, CAD s/p prior MI s/p prior PCI, Chronic Diastolic CHF (EF 50%, moderate LVH), CKD stage 3 s/p diagnostic cardiac cath 12/6/18 revealing severe 2vCAD, for midCABG on 12/10/18, renal following for CKD stage 3, pulm following for KOKO, heme following for thrombocytopenia

## 2018-12-10 NOTE — PROGRESS NOTE ADULT - SUBJECTIVE AND OBJECTIVE BOX
HPI:  84 y/o M, former smoker, walks with cane, with PMHx HTN, CAD s/p prior MI (denies history of stents), CHF (on Lasix BID, EF unknown), atrial fibrillation/flutter on Coumadin (last dose 12/4/18), PUD (no history of GI bleeding), recently diagnosed KOKO (does not yet have machine for home), multiple falls in the last 2 months, ?CKD (Cr 1.30 in October 2018), who initially presented to emergency room in [facility] c/o worsening shortness of breath with palpitations. He has been experiencing shortness of breath with chest tightness for the last 2 months. He visited his primary doctor who adjusted his medications which improved his LE edema (patient reports chronic LE edema with blistering) but his SOB persisted. He can only walk < 1 block before he must stop secondary to SOB and chest tightness. He also reports difficulty sleeping/lying flat at night and frequently has only been able to sleep sitting up in a chair recently. He denies n/v, diaphoresis, lightheadedness, PND, or syncope. He had CT Chest/Calcium score performed 10/17/18 showed calcium score 2446; LM 0, , LAD 1094, LCx 615, The CTA with contrast was not performed as it was contraindicated given the extensive coronary calcium score and the irregular heartbeats due to atrial fibrillation. Also of note, CT Chest 10/20/18 showed 3.5x3.1cm infra-renal abdominal aortic aneurysm. Labs from 10/22/18 showed platelets 32. BUN/Cr 25/1.30. In light of patient’s risk factors, CCS Class III-IV angina equivalent symptoms, and extensive coronary calcium score, he is recommended for cardiac catheterization with possible intervention if clinically indicated. (05 Dec 2018 12:01)    FAMILY HISTORY:  Family history of diabetes mellitus (Sibling)    MEDICATIONS  (STANDING):  aspirin enteric coated 81 milliGRAM(s) Oral daily  bisacodyl 5 milliGRAM(s) Oral at bedtime  cyanocobalamin Injectable 1000 MICROGram(s) IntraMuscular daily  diltiazem    milliGRAM(s) Oral daily  folic acid 1 milliGRAM(s) Oral daily  heparin  Infusion. 1200 Unit(s)/Hr (12 mL/Hr) IV Continuous <Continuous>  metoprolol tartrate 12.5 milliGRAM(s) Oral every 12 hours  pantoprazole    Tablet 40 milliGRAM(s) Oral before breakfast    MEDICATIONS  (PRN):  heparin  Injectable 6000 Unit(s) IV Push every 6 hours PRN For aPTT less than 40    Vital Signs Last 24 Hrs  T(C): 36.2 (10 Dec 2018 05:13), Max: 36.5 (09 Dec 2018 23:57)  T(F): 97.1 (10 Dec 2018 05:13), Max: 97.7 (09 Dec 2018 23:57)  HR: 99 (10 Dec 2018 06:31) (84 - 131)  BP: 131/79 (10 Dec 2018 06:31) (104/82 - 131/79)  BP(mean): --  RR: 18 (10 Dec 2018 06:31) (16 - 18)  SpO2: 98% (10 Dec 2018 06:31) (91% - 98%)    Physical exam:    Overall impression  Lymphadenopathy  Liver  spleen    Labs:  CBC Full  -  ( 09 Dec 2018 13:47 )  WBC Count : 8.0 K/uL  Hemoglobin : 15.8 g/dL  Hematocrit : 49.4 %  Platelet Count - Automated : 82 K/uL  Mean Cell Volume : 88.8 fL  Mean Cell Hemoglobin : 28.4 pg  Mean Cell Hemoglobin Concentration : 32.0 g/dL  Auto Neutrophil # : x  Auto Lymphocyte # : x  Auto Monocyte # : x  Auto Eosinophil # : x  Auto Basophil # : x  Auto Neutrophil % : x  Auto Lymphocyte % : x  Auto Monocyte % : x  Auto Eosinophil % : x  Auto Basophil % : x    12-10    143  |  102  |  26<H>  ----------------------------<  82  4.3   |  33<H>  |  1.36<H>    Ca    10.5      10 Dec 2018 06:54  Mg     2.1     12-10    TPro  6.7  /  Alb  3.6  /  TBili  1.4<H>  /  DBili  x   /  AST  17  /  ALT  21  /  AlkPhos  81  12-10      Radiology:  HEALTH ISSUES - R/O PROBLEM Dx:      Assessmant / Problems  1)Thrombocytopenia  Awaiting today<s platelet count  2 bags platelets ordered just in case needed  Heparine can be used  2) Vitamin B 12 deficiency on sq Vitamin B 12 supplementation      Thank you  Sonam Sneed MD

## 2018-12-10 NOTE — PROGRESS NOTE ADULT - PROBLEM SELECTOR PROBLEM 5
BPH (benign prostatic hyperplasia)
BPH (benign prostatic hyperplasia)
Thrombocytopenia

## 2018-12-10 NOTE — PROGRESS NOTE ADULT - SUBJECTIVE AND OBJECTIVE BOX
S: Pt seen and examined bedside.  Patient denies C/P, SOB, N/V, dizziness, palpitations, and diaphoresis.  Pt denies fever/chills, dysuria, abdominal pain, diarrhea, and cough  12 Point ROS otherwise negative except as per HPI/subjective.     O: Vital Signs Last 24 Hrs  T(C): 36.1 (10 Dec 2018 09:00), Max: 36.5 (09 Dec 2018 23:57)  T(F): 97 (10 Dec 2018 09:00), Max: 97.7 (09 Dec 2018 23:57)  HR: 83 (10 Dec 2018 09:10) (83 - 108)  BP: 124/74 (10 Dec 2018 08:54) (104/82 - 131/79)  RR: 18 (10 Dec 2018 08:54) (16 - 18)  SpO2: 97% (10 Dec 2018 09:10) (91% - 98%)    PHYSICAL EXAM:  GEN: NAD  PULM:  Decreased BS B/L Bases, No Crackles/Rhonchi/Wheeze  CARD: Decreased JVD, Irreg Irreg, S1 and S2   ABD: +BS, NT, soft/ND	  EXT: No Edema B/L LE, Venous Stasis Change B/L LE  R WRIST: soft, no hematoma, no bleeding, radial pulse 2+  NEURO: A+Ox3, no focal deficit  LABS:                        16.0   5.6   )-----------( 80       ( 10 Dec 2018 06:54 )             50.6     12-10    143  |  102  |  26<H>  ----------------------------<  82  4.3   |  33<H>  |  1.36<H>    Ca    10.5      10 Dec 2018 06:54  Mg     2.1     12-10    TPro  6.7  /  Alb  3.6  /  TBili  1.4<H>  /  DBili  x   /  AST  17  /  ALT  21  /  AlkPhos  81  12-10    PT/INR - ( 10 Dec 2018 06:54 )   PT: 17.2 sec;   INR: 1.50          PTT - ( 10 Dec 2018 06:54 )  PTT:84.6 sec     @ 07:01  -  12-10 @ 07:00  --------------------------------------------------------  IN: 324 mL / OUT: 570 mL / NET: -246 mL    Daily Weight in k (10 Dec 2018 11:35)

## 2018-12-10 NOTE — PROGRESS NOTE ADULT - PROBLEM SELECTOR PLAN 7
Incentive Spirometry and OOB w/ assistance  Repeat CT chest post-op per Pulm     DVT PPX: Heparin gtt    Dispo: for midCABG on 12/10/18.
Incentive Spirometry and OOB w/ assistance  Repeat CT chest post-op    DVT PPX: Heparin gtt    Dispo: for midCABG on 12/10/18

## 2018-12-11 VITALS — HEIGHT: 70.87 IN | BODY MASS INDEX: 32.41 KG/M2 | WEIGHT: 231.49 LBS

## 2018-12-11 PROBLEM — I25.10 CAD (CORONARY ARTERY DISEASE): Status: ACTIVE | Noted: 2018-12-11

## 2018-12-11 PROBLEM — Z09 POSTOP CHECK: Status: ACTIVE | Noted: 2018-12-11

## 2018-12-11 PROBLEM — Z95.1 S/P CABG X 1: Status: ACTIVE | Noted: 2018-12-11

## 2018-12-11 LAB
ALBUMIN SERPL ELPH-MCNC: 3.3 G/DL — SIGNIFICANT CHANGE UP (ref 3.3–5)
ALBUMIN SERPL ELPH-MCNC: 3.5 G/DL — SIGNIFICANT CHANGE UP (ref 3.3–5)
ALBUMIN SERPL ELPH-MCNC: 3.6 G/DL — SIGNIFICANT CHANGE UP (ref 3.3–5)
ALBUMIN SERPL ELPH-MCNC: 4 G/DL — SIGNIFICANT CHANGE UP (ref 3.3–5)
ALP SERPL-CCNC: 50 U/L — SIGNIFICANT CHANGE UP (ref 40–120)
ALP SERPL-CCNC: 53 U/L — SIGNIFICANT CHANGE UP (ref 40–120)
ALP SERPL-CCNC: 70 U/L — SIGNIFICANT CHANGE UP (ref 40–120)
ALP SERPL-CCNC: 74 U/L — SIGNIFICANT CHANGE UP (ref 40–120)
ALT FLD-CCNC: 10 U/L — SIGNIFICANT CHANGE UP (ref 10–45)
ALT FLD-CCNC: 13 U/L — SIGNIFICANT CHANGE UP (ref 10–45)
ALT FLD-CCNC: 17 U/L — SIGNIFICANT CHANGE UP (ref 10–45)
ALT FLD-CCNC: 18 U/L — SIGNIFICANT CHANGE UP (ref 10–45)
ANION GAP SERPL CALC-SCNC: 11 MMOL/L — SIGNIFICANT CHANGE UP (ref 5–17)
ANION GAP SERPL CALC-SCNC: 11 MMOL/L — SIGNIFICANT CHANGE UP (ref 5–17)
ANION GAP SERPL CALC-SCNC: 12 MMOL/L — SIGNIFICANT CHANGE UP (ref 5–17)
ANION GAP SERPL CALC-SCNC: 13 MMOL/L — SIGNIFICANT CHANGE UP (ref 5–17)
APTT BLD: 27.5 SEC — SIGNIFICANT CHANGE UP (ref 27.5–36.3)
APTT BLD: 28.9 SEC — SIGNIFICANT CHANGE UP (ref 27.5–36.3)
APTT BLD: 29.2 SEC — SIGNIFICANT CHANGE UP (ref 27.5–36.3)
APTT BLD: 30.8 SEC — SIGNIFICANT CHANGE UP (ref 27.5–36.3)
AST SERPL-CCNC: 17 U/L — SIGNIFICANT CHANGE UP (ref 10–40)
AST SERPL-CCNC: 17 U/L — SIGNIFICANT CHANGE UP (ref 10–40)
AST SERPL-CCNC: 19 U/L — SIGNIFICANT CHANGE UP (ref 10–40)
AST SERPL-CCNC: 21 U/L — SIGNIFICANT CHANGE UP (ref 10–40)
BASOPHILS NFR BLD AUTO: 0.1 % — SIGNIFICANT CHANGE UP (ref 0–2)
BILIRUB SERPL-MCNC: 0.9 MG/DL — SIGNIFICANT CHANGE UP (ref 0.2–1.2)
BILIRUB SERPL-MCNC: 1 MG/DL — SIGNIFICANT CHANGE UP (ref 0.2–1.2)
BILIRUB SERPL-MCNC: 1 MG/DL — SIGNIFICANT CHANGE UP (ref 0.2–1.2)
BILIRUB SERPL-MCNC: 1.2 MG/DL — SIGNIFICANT CHANGE UP (ref 0.2–1.2)
BUN SERPL-MCNC: 19 MG/DL — SIGNIFICANT CHANGE UP (ref 7–23)
BUN SERPL-MCNC: 20 MG/DL — SIGNIFICANT CHANGE UP (ref 7–23)
BUN SERPL-MCNC: 23 MG/DL — SIGNIFICANT CHANGE UP (ref 7–23)
BUN SERPL-MCNC: 24 MG/DL — HIGH (ref 7–23)
CALCIUM SERPL-MCNC: 10 MG/DL — SIGNIFICANT CHANGE UP (ref 8.4–10.5)
CALCIUM SERPL-MCNC: 10.1 MG/DL — SIGNIFICANT CHANGE UP (ref 8.4–10.5)
CALCIUM SERPL-MCNC: 10.1 MG/DL — SIGNIFICANT CHANGE UP (ref 8.4–10.5)
CALCIUM SERPL-MCNC: 9.9 MG/DL — SIGNIFICANT CHANGE UP (ref 8.4–10.5)
CHLORIDE SERPL-SCNC: 100 MMOL/L — SIGNIFICANT CHANGE UP (ref 96–108)
CHLORIDE SERPL-SCNC: 100 MMOL/L — SIGNIFICANT CHANGE UP (ref 96–108)
CHLORIDE SERPL-SCNC: 101 MMOL/L — SIGNIFICANT CHANGE UP (ref 96–108)
CHLORIDE SERPL-SCNC: 99 MMOL/L — SIGNIFICANT CHANGE UP (ref 96–108)
CO2 SERPL-SCNC: 26 MMOL/L — SIGNIFICANT CHANGE UP (ref 22–31)
CO2 SERPL-SCNC: 27 MMOL/L — SIGNIFICANT CHANGE UP (ref 22–31)
CO2 SERPL-SCNC: 27 MMOL/L — SIGNIFICANT CHANGE UP (ref 22–31)
CO2 SERPL-SCNC: 29 MMOL/L — SIGNIFICANT CHANGE UP (ref 22–31)
CREAT SERPL-MCNC: 1.02 MG/DL — SIGNIFICANT CHANGE UP (ref 0.5–1.3)
CREAT SERPL-MCNC: 1.1 MG/DL — SIGNIFICANT CHANGE UP (ref 0.5–1.3)
CREAT SERPL-MCNC: 1.11 MG/DL — SIGNIFICANT CHANGE UP (ref 0.5–1.3)
CREAT SERPL-MCNC: 1.17 MG/DL — SIGNIFICANT CHANGE UP (ref 0.5–1.3)
EOSINOPHIL NFR BLD AUTO: 0.1 % — SIGNIFICANT CHANGE UP (ref 0–6)
GAS PNL BLDA: SIGNIFICANT CHANGE UP
GLUCOSE BLDC GLUCOMTR-MCNC: 130 MG/DL — HIGH (ref 70–99)
GLUCOSE BLDC GLUCOMTR-MCNC: 131 MG/DL — HIGH (ref 70–99)
GLUCOSE SERPL-MCNC: 137 MG/DL — HIGH (ref 70–99)
GLUCOSE SERPL-MCNC: 138 MG/DL — HIGH (ref 70–99)
GLUCOSE SERPL-MCNC: 146 MG/DL — HIGH (ref 70–99)
GLUCOSE SERPL-MCNC: 231 MG/DL — HIGH (ref 70–99)
HCT VFR BLD CALC: 40.3 % — SIGNIFICANT CHANGE UP (ref 39–50)
HCT VFR BLD CALC: 40.4 % — SIGNIFICANT CHANGE UP (ref 39–50)
HCT VFR BLD CALC: 44.3 % — SIGNIFICANT CHANGE UP (ref 39–50)
HCT VFR BLD CALC: 45.5 % — SIGNIFICANT CHANGE UP (ref 39–50)
HGB BLD-MCNC: 12.9 G/DL — LOW (ref 13–17)
HGB BLD-MCNC: 13.3 G/DL — SIGNIFICANT CHANGE UP (ref 13–17)
HGB BLD-MCNC: 14.4 G/DL — SIGNIFICANT CHANGE UP (ref 13–17)
HGB BLD-MCNC: 14.8 G/DL — SIGNIFICANT CHANGE UP (ref 13–17)
INR BLD: 1.38 — HIGH (ref 0.88–1.16)
INR BLD: 1.44 — HIGH (ref 0.88–1.16)
INR BLD: 1.69 — HIGH (ref 0.88–1.16)
INR BLD: 1.7 — HIGH (ref 0.88–1.16)
LACTATE SERPL-SCNC: 0.9 MMOL/L — SIGNIFICANT CHANGE UP (ref 0.5–2)
LACTATE SERPL-SCNC: 1.4 MMOL/L — SIGNIFICANT CHANGE UP (ref 0.5–2)
LACTATE SERPL-SCNC: 1.8 MMOL/L — SIGNIFICANT CHANGE UP (ref 0.5–2)
LACTATE SERPL-SCNC: 3.6 MMOL/L — HIGH (ref 0.5–2)
LYMPHOCYTES # BLD AUTO: 3.8 % — LOW (ref 13–44)
MAGNESIUM SERPL-MCNC: 1.8 MG/DL — SIGNIFICANT CHANGE UP (ref 1.6–2.6)
MAGNESIUM SERPL-MCNC: 2.2 MG/DL — SIGNIFICANT CHANGE UP (ref 1.6–2.6)
MAGNESIUM SERPL-MCNC: 2.3 MG/DL — SIGNIFICANT CHANGE UP (ref 1.6–2.6)
MAGNESIUM SERPL-MCNC: 2.5 MG/DL — SIGNIFICANT CHANGE UP (ref 1.6–2.6)
MCHC RBC-ENTMCNC: 28.2 PG — SIGNIFICANT CHANGE UP (ref 27–34)
MCHC RBC-ENTMCNC: 28.5 PG — SIGNIFICANT CHANGE UP (ref 27–34)
MCHC RBC-ENTMCNC: 28.7 PG — SIGNIFICANT CHANGE UP (ref 27–34)
MCHC RBC-ENTMCNC: 28.9 PG — SIGNIFICANT CHANGE UP (ref 27–34)
MCHC RBC-ENTMCNC: 31.9 G/DL — LOW (ref 32–36)
MCHC RBC-ENTMCNC: 32.5 G/DL — SIGNIFICANT CHANGE UP (ref 32–36)
MCHC RBC-ENTMCNC: 32.5 G/DL — SIGNIFICANT CHANGE UP (ref 32–36)
MCHC RBC-ENTMCNC: 33 G/DL — SIGNIFICANT CHANGE UP (ref 32–36)
MCV RBC AUTO: 87.6 FL — SIGNIFICANT CHANGE UP (ref 80–100)
MCV RBC AUTO: 87.7 FL — SIGNIFICANT CHANGE UP (ref 80–100)
MCV RBC AUTO: 88.2 FL — SIGNIFICANT CHANGE UP (ref 80–100)
MCV RBC AUTO: 88.4 FL — SIGNIFICANT CHANGE UP (ref 80–100)
MONOCYTES NFR BLD AUTO: 9.7 % — SIGNIFICANT CHANGE UP (ref 2–14)
NEUTROPHILS NFR BLD AUTO: 86.3 % — HIGH (ref 43–77)
PHOSPHATE SERPL-MCNC: 1.6 MG/DL — LOW (ref 2.5–4.5)
PHOSPHATE SERPL-MCNC: 2 MG/DL — LOW (ref 2.5–4.5)
PHOSPHATE SERPL-MCNC: 2.7 MG/DL — SIGNIFICANT CHANGE UP (ref 2.5–4.5)
PHOSPHATE SERPL-MCNC: 3.2 MG/DL — SIGNIFICANT CHANGE UP (ref 2.5–4.5)
PLATELET # BLD AUTO: 73 K/UL — LOW (ref 150–400)
PLATELET # BLD AUTO: 77 K/UL — LOW (ref 150–400)
PLATELET # BLD AUTO: 79 K/UL — LOW (ref 150–400)
PLATELET # BLD AUTO: 87 K/UL — LOW (ref 150–400)
POTASSIUM SERPL-MCNC: 4.1 MMOL/L — SIGNIFICANT CHANGE UP (ref 3.5–5.3)
POTASSIUM SERPL-MCNC: 4.4 MMOL/L — SIGNIFICANT CHANGE UP (ref 3.5–5.3)
POTASSIUM SERPL-MCNC: 4.4 MMOL/L — SIGNIFICANT CHANGE UP (ref 3.5–5.3)
POTASSIUM SERPL-MCNC: 4.5 MMOL/L — SIGNIFICANT CHANGE UP (ref 3.5–5.3)
POTASSIUM SERPL-SCNC: 4.1 MMOL/L — SIGNIFICANT CHANGE UP (ref 3.5–5.3)
POTASSIUM SERPL-SCNC: 4.4 MMOL/L — SIGNIFICANT CHANGE UP (ref 3.5–5.3)
POTASSIUM SERPL-SCNC: 4.4 MMOL/L — SIGNIFICANT CHANGE UP (ref 3.5–5.3)
POTASSIUM SERPL-SCNC: 4.5 MMOL/L — SIGNIFICANT CHANGE UP (ref 3.5–5.3)
PROT SERPL-MCNC: 5.9 G/DL — LOW (ref 6–8.3)
PROT SERPL-MCNC: 6.1 G/DL — SIGNIFICANT CHANGE UP (ref 6–8.3)
PROT SERPL-MCNC: 6.2 G/DL — SIGNIFICANT CHANGE UP (ref 6–8.3)
PROT SERPL-MCNC: 6.4 G/DL — SIGNIFICANT CHANGE UP (ref 6–8.3)
PROTHROM AB SERPL-ACNC: 15.7 SEC — HIGH (ref 10–12.9)
PROTHROM AB SERPL-ACNC: 16.5 SEC — HIGH (ref 10–12.9)
PROTHROM AB SERPL-ACNC: 19.4 SEC — HIGH (ref 10–12.9)
PROTHROM AB SERPL-ACNC: 19.5 SEC — HIGH (ref 10–12.9)
RBC # BLD: 4.58 M/UL — SIGNIFICANT CHANGE UP (ref 4.2–5.8)
RBC # BLD: 4.6 M/UL — SIGNIFICANT CHANGE UP (ref 4.2–5.8)
RBC # BLD: 5.01 M/UL — SIGNIFICANT CHANGE UP (ref 4.2–5.8)
RBC # BLD: 5.19 M/UL — SIGNIFICANT CHANGE UP (ref 4.2–5.8)
RBC # FLD: 13.7 % — SIGNIFICANT CHANGE UP (ref 10.3–16.9)
RBC # FLD: 13.8 % — SIGNIFICANT CHANGE UP (ref 10.3–16.9)
RBC # FLD: 13.8 % — SIGNIFICANT CHANGE UP (ref 10.3–16.9)
RBC # FLD: 13.9 % — SIGNIFICANT CHANGE UP (ref 10.3–16.9)
SODIUM SERPL-SCNC: 136 MMOL/L — SIGNIFICANT CHANGE UP (ref 135–145)
SODIUM SERPL-SCNC: 140 MMOL/L — SIGNIFICANT CHANGE UP (ref 135–145)
WBC # BLD: 10.1 K/UL — SIGNIFICANT CHANGE UP (ref 3.8–10.5)
WBC # BLD: 9.1 K/UL — SIGNIFICANT CHANGE UP (ref 3.8–10.5)
WBC # BLD: 9.3 K/UL — SIGNIFICANT CHANGE UP (ref 3.8–10.5)
WBC # BLD: 9.3 K/UL — SIGNIFICANT CHANGE UP (ref 3.8–10.5)
WBC # FLD AUTO: 10.1 K/UL — SIGNIFICANT CHANGE UP (ref 3.8–10.5)
WBC # FLD AUTO: 9.1 K/UL — SIGNIFICANT CHANGE UP (ref 3.8–10.5)
WBC # FLD AUTO: 9.3 K/UL — SIGNIFICANT CHANGE UP (ref 3.8–10.5)
WBC # FLD AUTO: 9.3 K/UL — SIGNIFICANT CHANGE UP (ref 3.8–10.5)

## 2018-12-11 PROCEDURE — 71045 X-RAY EXAM CHEST 1 VIEW: CPT | Mod: 26

## 2018-12-11 PROCEDURE — 99232 SBSQ HOSP IP/OBS MODERATE 35: CPT | Mod: GC

## 2018-12-11 PROCEDURE — 99291 CRITICAL CARE FIRST HOUR: CPT

## 2018-12-11 RX ORDER — DEXTROSE 50 % IN WATER 50 %
25 SYRINGE (ML) INTRAVENOUS ONCE
Qty: 0 | Refills: 0 | Status: DISCONTINUED | OUTPATIENT
Start: 2018-12-11 | End: 2018-12-13

## 2018-12-11 RX ORDER — FAMOTIDINE 10 MG/ML
20 INJECTION INTRAVENOUS
Qty: 0 | Refills: 0 | Status: DISCONTINUED | OUTPATIENT
Start: 2018-12-11 | End: 2018-12-12

## 2018-12-11 RX ORDER — ALBUMIN HUMAN 25 %
250 VIAL (ML) INTRAVENOUS ONCE
Qty: 0 | Refills: 0 | Status: COMPLETED | OUTPATIENT
Start: 2018-12-11 | End: 2018-12-11

## 2018-12-11 RX ORDER — DEXTROSE 50 % IN WATER 50 %
15 SYRINGE (ML) INTRAVENOUS ONCE
Qty: 0 | Refills: 0 | Status: DISCONTINUED | OUTPATIENT
Start: 2018-12-11 | End: 2018-12-13

## 2018-12-11 RX ORDER — METOPROLOL TARTRATE 50 MG
12.5 TABLET ORAL ONCE
Qty: 0 | Refills: 0 | Status: COMPLETED | OUTPATIENT
Start: 2018-12-11 | End: 2018-12-11

## 2018-12-11 RX ORDER — METOPROLOL TARTRATE 50 MG
12.5 TABLET ORAL
Qty: 0 | Refills: 0 | Status: DISCONTINUED | OUTPATIENT
Start: 2018-12-11 | End: 2018-12-13

## 2018-12-11 RX ORDER — INSULIN LISPRO 100/ML
VIAL (ML) SUBCUTANEOUS
Qty: 0 | Refills: 0 | Status: DISCONTINUED | OUTPATIENT
Start: 2018-12-11 | End: 2018-12-13

## 2018-12-11 RX ORDER — FINASTERIDE 5 MG/1
5 TABLET, FILM COATED ORAL DAILY
Qty: 0 | Refills: 0 | Status: DISCONTINUED | OUTPATIENT
Start: 2018-12-11 | End: 2018-12-15

## 2018-12-11 RX ORDER — ACETAMINOPHEN 500 MG
1000 TABLET ORAL ONCE
Qty: 0 | Refills: 0 | Status: COMPLETED | OUTPATIENT
Start: 2018-12-11 | End: 2018-12-11

## 2018-12-11 RX ORDER — SODIUM CHLORIDE 9 MG/ML
500 INJECTION, SOLUTION INTRAVENOUS
Qty: 0 | Refills: 0 | Status: DISCONTINUED | OUTPATIENT
Start: 2018-12-11 | End: 2018-12-12

## 2018-12-11 RX ORDER — DEXTROSE 50 % IN WATER 50 %
12.5 SYRINGE (ML) INTRAVENOUS ONCE
Qty: 0 | Refills: 0 | Status: DISCONTINUED | OUTPATIENT
Start: 2018-12-11 | End: 2018-12-13

## 2018-12-11 RX ORDER — METOPROLOL TARTRATE 50 MG
2.5 TABLET ORAL ONCE
Qty: 0 | Refills: 0 | Status: COMPLETED | OUTPATIENT
Start: 2018-12-11 | End: 2018-12-11

## 2018-12-11 RX ORDER — MAGNESIUM SULFATE 500 MG/ML
2 VIAL (ML) INJECTION ONCE
Qty: 0 | Refills: 0 | Status: COMPLETED | OUTPATIENT
Start: 2018-12-11 | End: 2018-12-11

## 2018-12-11 RX ORDER — ATORVASTATIN CALCIUM 80 MG/1
80 TABLET, FILM COATED ORAL AT BEDTIME
Qty: 0 | Refills: 0 | Status: DISCONTINUED | OUTPATIENT
Start: 2018-12-11 | End: 2018-12-15

## 2018-12-11 RX ORDER — GLUCAGON INJECTION, SOLUTION 0.5 MG/.1ML
1 INJECTION, SOLUTION SUBCUTANEOUS ONCE
Qty: 0 | Refills: 0 | Status: DISCONTINUED | OUTPATIENT
Start: 2018-12-11 | End: 2018-12-15

## 2018-12-11 RX ORDER — SODIUM CHLORIDE 9 MG/ML
1000 INJECTION, SOLUTION INTRAVENOUS
Qty: 0 | Refills: 0 | Status: DISCONTINUED | OUTPATIENT
Start: 2018-12-11 | End: 2018-12-13

## 2018-12-11 RX ORDER — ACETAMINOPHEN 500 MG
1000 TABLET ORAL ONCE
Qty: 0 | Refills: 0 | Status: COMPLETED | OUTPATIENT
Start: 2018-12-11 | End: 2018-12-12

## 2018-12-11 RX ORDER — POLYETHYLENE GLYCOL 3350 17 G/17G
17 POWDER, FOR SOLUTION ORAL AT BEDTIME
Qty: 0 | Refills: 0 | Status: DISCONTINUED | OUTPATIENT
Start: 2018-12-11 | End: 2018-12-15

## 2018-12-11 RX ADMIN — Medication 100 MILLIGRAM(S): at 21:44

## 2018-12-11 RX ADMIN — Medication 1000 MILLIGRAM(S): at 06:44

## 2018-12-11 RX ADMIN — FINASTERIDE 5 MILLIGRAM(S): 5 TABLET, FILM COATED ORAL at 12:41

## 2018-12-11 RX ADMIN — Medication 125 MILLILITER(S): at 10:30

## 2018-12-11 RX ADMIN — HEPARIN SODIUM 5000 UNIT(S): 5000 INJECTION INTRAVENOUS; SUBCUTANEOUS at 21:44

## 2018-12-11 RX ADMIN — Medication 2.5 MILLIGRAM(S): at 15:00

## 2018-12-11 RX ADMIN — Medication 12.5 MILLIGRAM(S): at 18:10

## 2018-12-11 RX ADMIN — Medication 125 MILLILITER(S): at 17:00

## 2018-12-11 RX ADMIN — Medication 1000 MILLIGRAM(S): at 00:52

## 2018-12-11 RX ADMIN — HEPARIN SODIUM 5000 UNIT(S): 5000 INJECTION INTRAVENOUS; SUBCUTANEOUS at 05:19

## 2018-12-11 RX ADMIN — Medication 100 MILLIGRAM(S): at 06:44

## 2018-12-11 RX ADMIN — CLOPIDOGREL BISULFATE 75 MILLIGRAM(S): 75 TABLET, FILM COATED ORAL at 12:38

## 2018-12-11 RX ADMIN — HEPARIN SODIUM 5000 UNIT(S): 5000 INJECTION INTRAVENOUS; SUBCUTANEOUS at 12:39

## 2018-12-11 RX ADMIN — Medication 12.5 MILLIGRAM(S): at 07:38

## 2018-12-11 RX ADMIN — Medication 125 MILLILITER(S): at 03:15

## 2018-12-11 RX ADMIN — Medication 125 MILLILITER(S): at 12:00

## 2018-12-11 RX ADMIN — ATORVASTATIN CALCIUM 80 MILLIGRAM(S): 80 TABLET, FILM COATED ORAL at 21:44

## 2018-12-11 RX ADMIN — FAMOTIDINE 20 MILLIGRAM(S): 10 INJECTION INTRAVENOUS at 05:20

## 2018-12-11 RX ADMIN — CHLORHEXIDINE GLUCONATE 1 APPLICATION(S): 213 SOLUTION TOPICAL at 06:00

## 2018-12-11 RX ADMIN — Medication 400 MILLIGRAM(S): at 05:19

## 2018-12-11 RX ADMIN — Medication 50 GRAM(S): at 05:19

## 2018-12-11 RX ADMIN — CHLORHEXIDINE GLUCONATE 5 MILLILITER(S): 213 SOLUTION TOPICAL at 05:19

## 2018-12-11 RX ADMIN — Medication 125 MILLILITER(S): at 16:05

## 2018-12-11 RX ADMIN — CHLORHEXIDINE GLUCONATE 5 MILLILITER(S): 213 SOLUTION TOPICAL at 01:27

## 2018-12-11 RX ADMIN — Medication 81 MILLIGRAM(S): at 12:38

## 2018-12-11 RX ADMIN — Medication 125 MILLILITER(S): at 03:13

## 2018-12-11 RX ADMIN — FAMOTIDINE 20 MILLIGRAM(S): 10 INJECTION INTRAVENOUS at 18:10

## 2018-12-11 RX ADMIN — Medication 100 MILLIGRAM(S): at 12:39

## 2018-12-11 NOTE — PROGRESS NOTE ADULT - SUBJECTIVE AND OBJECTIVE BOX
INTERVAL HPI/OVERNIGHT EVENTS:  MIRNA o/n. No complaints this AM    ROS: denies F/C/S, lightheadedness, N/V, CP/pressure, dyspnea, abdominal pain    Vital Signs Last 24 Hrs  T(C): 36.9 (11 Dec 2018 13:52), Max: 37.2 (11 Dec 2018 06:00)  T(F): 98.4 (11 Dec 2018 13:52), Max: 99 (11 Dec 2018 06:00)  HR: 92 (11 Dec 2018 13:00) (84 - 144)  BP: 114/77 (11 Dec 2018 06:23) (114/77 - 145/80)  BP(mean): 90 (11 Dec 2018 06:23) (81 - 105)  RR: 14 (11 Dec 2018 13:00) (9 - 34)  SpO2: 98% (11 Dec 2018 13:00) (94% - 100%)    PHYSICAL EXAM:    Constitutional: WDWN, NAD, obese  HEENT: MMM, no JVD  Respiratory: CTA b/l  Cardiovascular: RRR, S1/S2, no M/R/G  Chest: midline thoracic incision w/ staples dry w/o erythema/edema/purulence  Gastrointestinal: BS present, soft, NTND  Extremities: WWP, trace edema in LE b/l    MEDICATIONS  (STANDING):  aspirin enteric coated 81 milliGRAM(s) Oral daily  atorvastatin 80 milliGRAM(s) Oral at bedtime  ceFAZolin   IVPB 2000 milliGRAM(s) IV Intermittent every 8 hours  chlorhexidine 0.12% Liquid 5 milliLiter(s) Oral Mucosa every 4 hours  chlorhexidine 2% Cloths 1 Application(s) Topical daily  clopidogrel Tablet 75 milliGRAM(s) Oral daily  dextrose 50% Injectable 50 milliLiter(s) IV Push every 15 minutes  dextrose 50% Injectable 25 milliLiter(s) IV Push every 15 minutes  famotidine    Tablet 20 milliGRAM(s) Oral two times a day  finasteride 5 milliGRAM(s) Oral daily  heparin  Injectable 5000 Unit(s) SubCutaneous every 8 hours  insulin Infusion 1 Unit(s)/Hr (1 mL/Hr) IV Continuous <Continuous>  lactated ringers. 500 milliLiter(s) (1000 mL/Hr) IV Continuous <Continuous>  metoprolol tartrate 12.5 milliGRAM(s) Oral <User Schedule>  sodium chloride 0.9%. 1000 milliLiter(s) (10 mL/Hr) IV Continuous <Continuous>    MEDICATIONS  (PRN):      Allergies    penicillin (Swelling)    Intolerances        LABS:                        14.4   9.3   )-----------( 79       ( 11 Dec 2018 09:46 )             44.3     12-11    136  |  99  |  23  ----------------------------<  231<H>  4.4   |  26  |  1.11    Ca    10.1      11 Dec 2018 09:46  Phos  2.7     12-11  Mg     2.5     12-11    TPro  6.1  /  Alb  3.5  /  TBili  1.0  /  DBili  x   /  AST  21  /  ALT  17  /  AlkPhos  70  12-11    PT/INR - ( 11 Dec 2018 09:46 )   PT: 16.5 sec;   INR: 1.44          PTT - ( 11 Dec 2018 09:46 )  PTT:29.2 sec      RADIOLOGY & ADDITIONAL TESTS:
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
## 2018-12-11 NOTE — PROGRESS NOTE ADULT - ASSESSMENT
85M PMH HTN, CAD, KOKO, afib on coumadin, PUD, admitted for elective CABG s/p midcabfor on 12/6 revealed extensive disease, nephrology consulted for CKD    #CKD - Cr continuing to improve 1.11 today. pt euvolemic on exam, last diuresed 10/8 at 5AM. PCR 0.1.  Renal sono w/ increase echogenicity of kidneys, marked prostatomegaly with increased bladder wall thickness c/f cystitis, pt s/p 1 dose of levaquin on 12/9. Uric acid WNL.   -iPTH = 176; vit D 25 = 27.9; vit D 1,25 = 38.3  -continue to hold ACEi  -If Cr rises again, please check post void residual as pt w/ significant BPH  -Pt will need to f/u w/ nephrology and urology when returning to [facility].       #HTN  -on cardizem/lopressor    #BPH - previously unable to calculate post void residual as pt unable to void, however, pre void w/ 137 cc.  US significant for marked prostatomegaly.  Pt denies difficulty w/ urination.  - consider addition of tamsulosin when clinically appropriate  - pt currently on proscar

## 2018-12-11 NOTE — PHYSICAL THERAPY INITIAL EVALUATION ADULT - GENERAL OBSERVATIONS, REHAB EVAL
patient received seated out of bed with no acute distress. +EKG, serenity, central line, jordan, +chest tube to wall suction x 2, SCDs, NC 3L/min
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
## 2018-12-12 LAB
ALBUMIN SERPL ELPH-MCNC: 3.3 G/DL — SIGNIFICANT CHANGE UP (ref 3.3–5)
ALBUMIN SERPL ELPH-MCNC: 3.4 G/DL — SIGNIFICANT CHANGE UP (ref 3.3–5)
ALP SERPL-CCNC: 50 U/L — SIGNIFICANT CHANGE UP (ref 40–120)
ALP SERPL-CCNC: 53 U/L — SIGNIFICANT CHANGE UP (ref 40–120)
ALT FLD-CCNC: 9 U/L — LOW (ref 10–45)
ALT FLD-CCNC: 9 U/L — LOW (ref 10–45)
ANION GAP SERPL CALC-SCNC: 10 MMOL/L — SIGNIFICANT CHANGE UP (ref 5–17)
ANION GAP SERPL CALC-SCNC: 9 MMOL/L — SIGNIFICANT CHANGE UP (ref 5–17)
APTT BLD: 30.2 SEC — SIGNIFICANT CHANGE UP (ref 27.5–36.3)
APTT BLD: 32.6 SEC — SIGNIFICANT CHANGE UP (ref 27.5–36.3)
AST SERPL-CCNC: 14 U/L — SIGNIFICANT CHANGE UP (ref 10–40)
AST SERPL-CCNC: 15 U/L — SIGNIFICANT CHANGE UP (ref 10–40)
BILIRUB DIRECT SERPL-MCNC: 0.3 MG/DL — HIGH (ref 0–0.2)
BILIRUB INDIRECT FLD-MCNC: 0.6 MG/DL — SIGNIFICANT CHANGE UP (ref 0.2–1)
BILIRUB SERPL-MCNC: 0.8 MG/DL — SIGNIFICANT CHANGE UP (ref 0.2–1.2)
BILIRUB SERPL-MCNC: 0.9 MG/DL — SIGNIFICANT CHANGE UP (ref 0.2–1.2)
BUN SERPL-MCNC: 19 MG/DL — SIGNIFICANT CHANGE UP (ref 7–23)
BUN SERPL-MCNC: 21 MG/DL — SIGNIFICANT CHANGE UP (ref 7–23)
CALCIUM SERPL-MCNC: 10.1 MG/DL — SIGNIFICANT CHANGE UP (ref 8.4–10.5)
CALCIUM SERPL-MCNC: 10.6 MG/DL — HIGH (ref 8.4–10.5)
CHLORIDE SERPL-SCNC: 103 MMOL/L — SIGNIFICANT CHANGE UP (ref 96–108)
CHLORIDE SERPL-SCNC: 98 MMOL/L — SIGNIFICANT CHANGE UP (ref 96–108)
CO2 SERPL-SCNC: 28 MMOL/L — SIGNIFICANT CHANGE UP (ref 22–31)
CO2 SERPL-SCNC: 29 MMOL/L — SIGNIFICANT CHANGE UP (ref 22–31)
CREAT SERPL-MCNC: 1.06 MG/DL — SIGNIFICANT CHANGE UP (ref 0.5–1.3)
CREAT SERPL-MCNC: 1.14 MG/DL — SIGNIFICANT CHANGE UP (ref 0.5–1.3)
GAS PNL BLDA: SIGNIFICANT CHANGE UP
GAS PNL BLDA: SIGNIFICANT CHANGE UP
GLUCOSE BLDC GLUCOMTR-MCNC: 122 MG/DL — HIGH (ref 70–99)
GLUCOSE BLDC GLUCOMTR-MCNC: 125 MG/DL — HIGH (ref 70–99)
GLUCOSE BLDC GLUCOMTR-MCNC: 133 MG/DL — HIGH (ref 70–99)
GLUCOSE BLDC GLUCOMTR-MCNC: 135 MG/DL — HIGH (ref 70–99)
GLUCOSE SERPL-MCNC: 133 MG/DL — HIGH (ref 70–99)
GLUCOSE SERPL-MCNC: 134 MG/DL — HIGH (ref 70–99)
HCT VFR BLD CALC: 39.8 % — SIGNIFICANT CHANGE UP (ref 39–50)
HCT VFR BLD CALC: 40.1 % — SIGNIFICANT CHANGE UP (ref 39–50)
HGB BLD-MCNC: 12.9 G/DL — LOW (ref 13–17)
HGB BLD-MCNC: 13.2 G/DL — SIGNIFICANT CHANGE UP (ref 13–17)
INR BLD: 1.42 — HIGH (ref 0.88–1.16)
INR BLD: 1.63 — HIGH (ref 0.88–1.16)
LACTATE SERPL-SCNC: 1 MMOL/L — SIGNIFICANT CHANGE UP (ref 0.5–2)
LACTATE SERPL-SCNC: 1.2 MMOL/L — SIGNIFICANT CHANGE UP (ref 0.5–2)
MAGNESIUM SERPL-MCNC: 2.1 MG/DL — SIGNIFICANT CHANGE UP (ref 1.6–2.6)
MAGNESIUM SERPL-MCNC: 2.3 MG/DL — SIGNIFICANT CHANGE UP (ref 1.6–2.6)
MCHC RBC-ENTMCNC: 28.3 PG — SIGNIFICANT CHANGE UP (ref 27–34)
MCHC RBC-ENTMCNC: 28.8 PG — SIGNIFICANT CHANGE UP (ref 27–34)
MCHC RBC-ENTMCNC: 32.2 G/DL — SIGNIFICANT CHANGE UP (ref 32–36)
MCHC RBC-ENTMCNC: 33.2 G/DL — SIGNIFICANT CHANGE UP (ref 32–36)
MCV RBC AUTO: 86.7 FL — SIGNIFICANT CHANGE UP (ref 80–100)
MCV RBC AUTO: 87.9 FL — SIGNIFICANT CHANGE UP (ref 80–100)
PHOSPHATE SERPL-MCNC: 2 MG/DL — LOW (ref 2.5–4.5)
PLATELET # BLD AUTO: 73 K/UL — LOW (ref 150–400)
PLATELET # BLD AUTO: 81 K/UL — LOW (ref 150–400)
POTASSIUM SERPL-MCNC: 4.2 MMOL/L — SIGNIFICANT CHANGE UP (ref 3.5–5.3)
POTASSIUM SERPL-MCNC: 4.4 MMOL/L — SIGNIFICANT CHANGE UP (ref 3.5–5.3)
POTASSIUM SERPL-SCNC: 4.2 MMOL/L — SIGNIFICANT CHANGE UP (ref 3.5–5.3)
POTASSIUM SERPL-SCNC: 4.4 MMOL/L — SIGNIFICANT CHANGE UP (ref 3.5–5.3)
PROT SERPL-MCNC: 5.5 G/DL — LOW (ref 6–8.3)
PROT SERPL-MCNC: 5.8 G/DL — LOW (ref 6–8.3)
PROTHROM AB SERPL-ACNC: 16.2 SEC — HIGH (ref 10–12.9)
PROTHROM AB SERPL-ACNC: 18.7 SEC — HIGH (ref 10–12.9)
RBC # BLD: 4.56 M/UL — SIGNIFICANT CHANGE UP (ref 4.2–5.8)
RBC # BLD: 4.59 M/UL — SIGNIFICANT CHANGE UP (ref 4.2–5.8)
RBC # FLD: 13.8 % — SIGNIFICANT CHANGE UP (ref 10.3–16.9)
RBC # FLD: 14.3 % — SIGNIFICANT CHANGE UP (ref 10.3–16.9)
SODIUM SERPL-SCNC: 136 MMOL/L — SIGNIFICANT CHANGE UP (ref 135–145)
SODIUM SERPL-SCNC: 141 MMOL/L — SIGNIFICANT CHANGE UP (ref 135–145)
WBC # BLD: 10 K/UL — SIGNIFICANT CHANGE UP (ref 3.8–10.5)
WBC # BLD: 9.4 K/UL — SIGNIFICANT CHANGE UP (ref 3.8–10.5)
WBC # FLD AUTO: 10 K/UL — SIGNIFICANT CHANGE UP (ref 3.8–10.5)
WBC # FLD AUTO: 9.4 K/UL — SIGNIFICANT CHANGE UP (ref 3.8–10.5)

## 2018-12-12 PROCEDURE — 99291 CRITICAL CARE FIRST HOUR: CPT

## 2018-12-12 PROCEDURE — 71045 X-RAY EXAM CHEST 1 VIEW: CPT | Mod: 26

## 2018-12-12 RX ORDER — POTASSIUM PHOSPHATE, MONOBASIC POTASSIUM PHOSPHATE, DIBASIC 236; 224 MG/ML; MG/ML
15 INJECTION, SOLUTION INTRAVENOUS ONCE
Qty: 0 | Refills: 0 | Status: COMPLETED | OUTPATIENT
Start: 2018-12-12 | End: 2018-12-12

## 2018-12-12 RX ORDER — DIGOXIN 250 MCG
0.12 TABLET ORAL EVERY 8 HOURS
Qty: 0 | Refills: 0 | Status: COMPLETED | OUTPATIENT
Start: 2018-12-12 | End: 2018-12-12

## 2018-12-12 RX ORDER — WARFARIN SODIUM 2.5 MG/1
4 TABLET ORAL ONCE
Qty: 0 | Refills: 0 | Status: COMPLETED | OUTPATIENT
Start: 2018-12-12 | End: 2018-12-12

## 2018-12-12 RX ORDER — METOPROLOL TARTRATE 50 MG
2.5 TABLET ORAL ONCE
Qty: 0 | Refills: 0 | Status: COMPLETED | OUTPATIENT
Start: 2018-12-12 | End: 2018-12-12

## 2018-12-12 RX ORDER — DIGOXIN 250 MCG
0.12 TABLET ORAL DAILY
Qty: 0 | Refills: 0 | Status: DISCONTINUED | OUTPATIENT
Start: 2018-12-13 | End: 2018-12-13

## 2018-12-12 RX ORDER — DIGOXIN 250 MCG
0.25 TABLET ORAL ONCE
Qty: 0 | Refills: 0 | Status: COMPLETED | OUTPATIENT
Start: 2018-12-12 | End: 2018-12-12

## 2018-12-12 RX ORDER — PANTOPRAZOLE SODIUM 20 MG/1
40 TABLET, DELAYED RELEASE ORAL
Qty: 0 | Refills: 0 | Status: DISCONTINUED | OUTPATIENT
Start: 2018-12-12 | End: 2018-12-15

## 2018-12-12 RX ORDER — CALCIUM GLUCONATE 100 MG/ML
2 VIAL (ML) INTRAVENOUS ONCE
Qty: 0 | Refills: 0 | Status: COMPLETED | OUTPATIENT
Start: 2018-12-12 | End: 2018-12-12

## 2018-12-12 RX ADMIN — Medication 12.5 MILLIGRAM(S): at 18:59

## 2018-12-12 RX ADMIN — Medication 100 MILLIGRAM(S): at 06:45

## 2018-12-12 RX ADMIN — HEPARIN SODIUM 5000 UNIT(S): 5000 INJECTION INTRAVENOUS; SUBCUTANEOUS at 22:58

## 2018-12-12 RX ADMIN — Medication 400 MILLIGRAM(S): at 14:52

## 2018-12-12 RX ADMIN — CHLORHEXIDINE GLUCONATE 1 APPLICATION(S): 213 SOLUTION TOPICAL at 10:37

## 2018-12-12 RX ADMIN — FAMOTIDINE 20 MILLIGRAM(S): 10 INJECTION INTRAVENOUS at 06:46

## 2018-12-12 RX ADMIN — Medication 81 MILLIGRAM(S): at 11:18

## 2018-12-12 RX ADMIN — WARFARIN SODIUM 4 MILLIGRAM(S): 2.5 TABLET ORAL at 22:59

## 2018-12-12 RX ADMIN — ATORVASTATIN CALCIUM 80 MILLIGRAM(S): 80 TABLET, FILM COATED ORAL at 23:00

## 2018-12-12 RX ADMIN — Medication 2.5 MILLIGRAM(S): at 01:05

## 2018-12-12 RX ADMIN — POLYETHYLENE GLYCOL 3350 17 GRAM(S): 17 POWDER, FOR SOLUTION ORAL at 22:58

## 2018-12-12 RX ADMIN — Medication 1000 MILLIGRAM(S): at 15:26

## 2018-12-12 RX ADMIN — Medication 0.12 MILLIGRAM(S): at 22:58

## 2018-12-12 RX ADMIN — POTASSIUM PHOSPHATE, MONOBASIC POTASSIUM PHOSPHATE, DIBASIC 62.5 MILLIMOLE(S): 236; 224 INJECTION, SOLUTION INTRAVENOUS at 01:11

## 2018-12-12 RX ADMIN — Medication 200 GRAM(S): at 05:27

## 2018-12-12 RX ADMIN — Medication 12.5 MILLIGRAM(S): at 06:46

## 2018-12-12 RX ADMIN — HEPARIN SODIUM 5000 UNIT(S): 5000 INJECTION INTRAVENOUS; SUBCUTANEOUS at 06:45

## 2018-12-12 RX ADMIN — Medication 0.12 MILLIGRAM(S): at 14:34

## 2018-12-12 RX ADMIN — FINASTERIDE 5 MILLIGRAM(S): 5 TABLET, FILM COATED ORAL at 11:18

## 2018-12-12 RX ADMIN — HEPARIN SODIUM 5000 UNIT(S): 5000 INJECTION INTRAVENOUS; SUBCUTANEOUS at 13:13

## 2018-12-12 RX ADMIN — Medication 0.25 MILLIGRAM(S): at 07:00

## 2018-12-12 RX ADMIN — CLOPIDOGREL BISULFATE 75 MILLIGRAM(S): 75 TABLET, FILM COATED ORAL at 11:18
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
## 2018-12-12 NOTE — PROGRESS NOTE ADULT - ASSESSMENT
85M PMH HTN, CAD, KOKO, afib on coumadin, PUD, admitted for elective CABG s/p midcabfor on 12/6 revealed extensive disease, nephrology consulted for CKD    #CKD - Cr continuing to improve 1.06 today. pt euvolemic on exam, last diuresed 10/8 at 5AM. protein:cr 0.1.  Renal sono w/ increase echogenicity of kidneys, marked prostatomegaly with increased bladder wall thickness c/f cystitis, pt s/p 1 dose of levaquin on 12/9. Uric acid WNL.   -iPTH = 176 (elevated); vit D 25 = 27.9; vit D 1,25 = 38.3 - no hypercalcemia at this time, can f/u OP  -continue to hold ACEi  -If Cr rises again, please check post void residual as pt w/ significant BPH  -Pt will need to f/u w/ nephrology and urology when returning to [facility].       #HTN  -on cardizem/lopressor  -BP downtrending likely 2/2 afib w/ RVR post midcab, pt receiving dig load this AM per primary team likely to correct BP no need for increased IVF as pt euvolemic on exam and w/ adequate UOP    #BPH - previously unable to calculate post void residual as pt unable to void, however, pre void w/ 137 cc.  US significant for marked prostatomegaly.  Pt denies difficulty w/ urination.  - consider addition of tamsulosin when clinically appropriate  - pt currently on proscar    to be discussed w/ attending

## 2018-12-12 NOTE — PROGRESS NOTE ADULT - SUBJECTIVE AND OBJECTIVE BOX
HPI:  86 y/o M, former smoker, walks with cane, with PMHx HTN, CAD s/p prior MI (denies history of stents), CHF (on Lasix BID, EF unknown), atrial fibrillation/flutter on Coumadin (last dose 12/4/18), PUD (no history of GI bleeding), recently diagnosed KOKO (does not yet have machine for home), multiple falls in the last 2 months, ?CKD (Cr 1.30 in October 2018), who initially presented to emergency room in [facility] c/o worsening shortness of breath with palpitations. He has been experiencing shortness of breath with chest tightness for the last 2 months. He visited his primary doctor who adjusted his medications which improved his LE edema (patient reports chronic LE edema with blistering) but his SOB persisted. He can only walk < 1 block before he must stop secondary to SOB and chest tightness. He also reports difficulty sleeping/lying flat at night and frequently has only been able to sleep sitting up in a chair recently. He denies n/v, diaphoresis, lightheadedness, PND, or syncope. He had CT Chest/Calcium score performed 10/17/18 showed calcium score 2446; LM 0, , LAD 1094, LCx 615, The CTA with contrast was not performed as it was contraindicated given the extensive coronary calcium score and the irregular heartbeats due to atrial fibrillation. Also of note, CT Chest 10/20/18 showed 3.5x3.1cm infra-renal abdominal aortic aneurysm. Labs from 10/22/18 showed platelets 32. BUN/Cr 25/1.30. In light of patient’s risk factors, CCS Class III-IV angina equivalent symptoms, and extensive coronary calcium score, he is recommended for cardiac catheterization with possible intervention if clinically indicated. (05 Dec 2018 12:01)    FAMILY HISTORY:  Family history of diabetes mellitus (Sibling)    MEDICATIONS  (STANDING):  acetaminophen  IVPB .. 1000 milliGRAM(s) IV Intermittent once  aspirin enteric coated 81 milliGRAM(s) Oral daily  atorvastatin 80 milliGRAM(s) Oral at bedtime  chlorhexidine 2% Cloths 1 Application(s) Topical daily  clopidogrel Tablet 75 milliGRAM(s) Oral daily  dextrose 5%. 1000 milliLiter(s) (50 mL/Hr) IV Continuous <Continuous>  dextrose 50% Injectable 12.5 Gram(s) IV Push once  dextrose 50% Injectable 25 Gram(s) IV Push once  dextrose 50% Injectable 25 Gram(s) IV Push once  digoxin  Injectable 0.125 milliGRAM(s) IV Push every 8 hours  famotidine    Tablet 20 milliGRAM(s) Oral two times a day  finasteride 5 milliGRAM(s) Oral daily  heparin  Injectable 5000 Unit(s) SubCutaneous every 8 hours  insulin lispro (HumaLOG) corrective regimen sliding scale   SubCutaneous Before meals and at bedtime  metoprolol tartrate 12.5 milliGRAM(s) Oral <User Schedule>  polyethylene glycol 3350 17 Gram(s) Oral at bedtime  sodium chloride 0.9%. 1000 milliLiter(s) (10 mL/Hr) IV Continuous <Continuous>    MEDICATIONS  (PRN):  dextrose 40% Gel 15 Gram(s) Oral once PRN Blood Glucose LESS THAN 70 milliGRAM(s)/deciliter  glucagon  Injectable 1 milliGRAM(s) IntraMuscular once PRN Glucose LESS THAN 70 milligrams/deciliter    Vital Signs Last 24 Hrs  T(C): 36.4 (12 Dec 2018 09:00), Max: 36.9 (11 Dec 2018 13:52)  T(F): 97.5 (12 Dec 2018 09:00), Max: 98.4 (11 Dec 2018 13:52)  HR: 122 (12 Dec 2018 09:00) (84 - 138)  BP: --  BP(mean): --  RR: 34 (12 Dec 2018 09:00) (14 - 37)  SpO2: 94% (12 Dec 2018 09:00) (89% - 100%)    Physical exam:    Overall impression  Lymphadenopathy  Liver  spleen    Labs:  CBC Full  -  ( 12 Dec 2018 03:03 )  WBC Count : 9.4 K/uL  Hemoglobin : 13.2 g/dL  Hematocrit : 39.8 %  Platelet Count - Automated : 73 K/uL  Mean Cell Volume : 86.7 fL  Mean Cell Hemoglobin : 28.8 pg  Mean Cell Hemoglobin Concentration : 33.2 g/dL  Auto Neutrophil # : x  Auto Lymphocyte # : x  Auto Monocyte # : x  Auto Eosinophil # : x  Auto Basophil # : x  Auto Neutrophil % : x  Auto Lymphocyte % : x  Auto Monocyte % : x  Auto Eosinophil % : x  Auto Basophil % : x    12-12    136  |  98  |  19  ----------------------------<  133<H>  4.2   |  29  |  1.06    Ca    10.1      12 Dec 2018 03:03  Phos  2.0     12-12  Mg     2.1     12-12    TPro  5.5<L>  /  Alb  3.4  /  TBili  0.8  /  DBili  x   /  AST  14  /  ALT  9<L>  /  AlkPhos  53  12-12      Radiology:  HEALTH ISSUES - R/O PROBLEM Dx:      Assessmant / Problems  Relatively satble thrombocytopenia  Peripheral smear reviewd : there is platelet clumping that explains thrombocytopenia  Plan:  continue current management  Thank you  Sonam Sneed MD

## 2018-12-12 NOTE — PROGRESS NOTE ADULT - SUBJECTIVE AND OBJECTIVE BOX
INTERVAL HPI/OVERNIGHT EVENTS:  pt received total of 1L 5% albumin, has been increasingly tachycardic in afib w/ RVR.  afebrile o/n. ambulated this AM, tolerating food well on DASH diet.    ROS: denies F/C/S, palpitations, N/V, lightheadedness, abdominal pain, diarrhea, dysuria  -notes constipation for last 2d w/ minimal flatus.  -notes pain over incision site minimal, denies CP/pressure/tightness/dyspnea    Vital Signs Last 24 Hrs  T(C): 36.4 (12 Dec 2018 09:00), Max: 36.9 (11 Dec 2018 13:52)  T(F): 97.5 (12 Dec 2018 09:00), Max: 98.4 (11 Dec 2018 13:52)  HR: 132 (12 Dec 2018 10:00) (84 - 138)  BP: --  BP(mean): --  RR: 32 (12 Dec 2018 10:00) (14 - 37)  SpO2: 95% (12 Dec 2018 10:00) (89% - 100%)    PHYSICAL EXAM:    Constitutional: WDWN, NAD, obese  HEENT: MMM, no JVD  Respiratory: CTA b/l  Cardiovascular: irregular rhythm tachycardic, S1/S2, no M/R/G  Chest: midline thoracic incision w/ staples dry w/o erythema/edema/purulence  Gastrointestinal: BS present, soft, NTND  Extremities: WWP, no edema in LE b/l    MEDICATIONS  (STANDING):  acetaminophen  IVPB .. 1000 milliGRAM(s) IV Intermittent once  aspirin enteric coated 81 milliGRAM(s) Oral daily  atorvastatin 80 milliGRAM(s) Oral at bedtime  chlorhexidine 2% Cloths 1 Application(s) Topical daily  clopidogrel Tablet 75 milliGRAM(s) Oral daily  dextrose 5%. 1000 milliLiter(s) (50 mL/Hr) IV Continuous <Continuous>  dextrose 50% Injectable 12.5 Gram(s) IV Push once  dextrose 50% Injectable 25 Gram(s) IV Push once  dextrose 50% Injectable 25 Gram(s) IV Push once  digoxin  Injectable 0.125 milliGRAM(s) IV Push every 8 hours  famotidine    Tablet 20 milliGRAM(s) Oral two times a day  finasteride 5 milliGRAM(s) Oral daily  heparin  Injectable 5000 Unit(s) SubCutaneous every 8 hours  insulin lispro (HumaLOG) corrective regimen sliding scale   SubCutaneous Before meals and at bedtime  metoprolol tartrate 12.5 milliGRAM(s) Oral <User Schedule>  polyethylene glycol 3350 17 Gram(s) Oral at bedtime  sodium chloride 0.9%. 1000 milliLiter(s) (10 mL/Hr) IV Continuous <Continuous>    MEDICATIONS  (PRN):  dextrose 40% Gel 15 Gram(s) Oral once PRN Blood Glucose LESS THAN 70 milliGRAM(s)/deciliter  glucagon  Injectable 1 milliGRAM(s) IntraMuscular once PRN Glucose LESS THAN 70 milligrams/deciliter      Allergies    penicillin (Swelling)    Intolerances        LABS:                        13.2   9.4   )-----------( 73       ( 12 Dec 2018 03:03 )             39.8     12-12    136  |  98  |  19  ----------------------------<  133<H>  4.2   |  29  |  1.06    Ca    10.1      12 Dec 2018 03:03  Phos  2.0     12-12  Mg     2.1     12-12    TPro  5.5<L>  /  Alb  3.4  /  TBili  0.8  /  DBili  x   /  AST  14  /  ALT  9<L>  /  AlkPhos  53  12-12    PT/INR - ( 12 Dec 2018 03:03 )   PT: 18.7 sec;   INR: 1.63          PTT - ( 12 Dec 2018 03:03 )  PTT:30.2 sec      RADIOLOGY & ADDITIONAL TESTS:

## 2018-12-13 LAB
ALBUMIN SERPL ELPH-MCNC: 3 G/DL — LOW (ref 3.3–5)
ALP SERPL-CCNC: 49 U/L — SIGNIFICANT CHANGE UP (ref 40–120)
ALT FLD-CCNC: 8 U/L — LOW (ref 10–45)
ANION GAP SERPL CALC-SCNC: 6 MMOL/L — SIGNIFICANT CHANGE UP (ref 5–17)
APTT BLD: 31 SEC — SIGNIFICANT CHANGE UP (ref 27.5–36.3)
AST SERPL-CCNC: 16 U/L — SIGNIFICANT CHANGE UP (ref 10–40)
BASE EXCESS BLDA CALC-SCNC: 6.1 MMOL/L — HIGH (ref -2–3)
BILIRUB SERPL-MCNC: 0.8 MG/DL — SIGNIFICANT CHANGE UP (ref 0.2–1.2)
BUN SERPL-MCNC: 22 MG/DL — SIGNIFICANT CHANGE UP (ref 7–23)
CALCIUM SERPL-MCNC: 9.9 MG/DL — SIGNIFICANT CHANGE UP (ref 8.4–10.5)
CHLORIDE SERPL-SCNC: 105 MMOL/L — SIGNIFICANT CHANGE UP (ref 96–108)
CO2 SERPL-SCNC: 29 MMOL/L — SIGNIFICANT CHANGE UP (ref 22–31)
CREAT SERPL-MCNC: 1.04 MG/DL — SIGNIFICANT CHANGE UP (ref 0.5–1.3)
GLUCOSE SERPL-MCNC: 115 MG/DL — HIGH (ref 70–99)
HCO3 BLDA-SCNC: 30 MMOL/L — HIGH (ref 21–28)
HCT VFR BLD CALC: 37.2 % — LOW (ref 39–50)
HGB BLD-MCNC: 11.9 G/DL — LOW (ref 13–17)
INR BLD: 1.28 — HIGH (ref 0.88–1.16)
MAGNESIUM SERPL-MCNC: 2 MG/DL — SIGNIFICANT CHANGE UP (ref 1.6–2.6)
MCHC RBC-ENTMCNC: 28.5 PG — SIGNIFICANT CHANGE UP (ref 27–34)
MCHC RBC-ENTMCNC: 32 G/DL — SIGNIFICANT CHANGE UP (ref 32–36)
MCV RBC AUTO: 89 FL — SIGNIFICANT CHANGE UP (ref 80–100)
PCO2 BLDA: 42 MMHG — SIGNIFICANT CHANGE UP (ref 35–48)
PH BLDA: 7.48 — HIGH (ref 7.35–7.45)
PLATELET # BLD AUTO: 79 K/UL — LOW (ref 150–400)
PO2 BLDA: 99 MMHG — SIGNIFICANT CHANGE UP (ref 83–108)
POTASSIUM SERPL-MCNC: 4.1 MMOL/L — SIGNIFICANT CHANGE UP (ref 3.5–5.3)
POTASSIUM SERPL-SCNC: 4.1 MMOL/L — SIGNIFICANT CHANGE UP (ref 3.5–5.3)
PROT SERPL-MCNC: 5.1 G/DL — LOW (ref 6–8.3)
PROTHROM AB SERPL-ACNC: 14.6 SEC — HIGH (ref 10–12.9)
RBC # BLD: 4.18 M/UL — LOW (ref 4.2–5.8)
RBC # FLD: 14.3 % — SIGNIFICANT CHANGE UP (ref 10.3–16.9)
SAO2 % BLDA: 98 % — SIGNIFICANT CHANGE UP (ref 95–100)
SODIUM SERPL-SCNC: 140 MMOL/L — SIGNIFICANT CHANGE UP (ref 135–145)
WBC # BLD: 8.5 K/UL — SIGNIFICANT CHANGE UP (ref 3.8–10.5)
WBC # FLD AUTO: 8.5 K/UL — SIGNIFICANT CHANGE UP (ref 3.8–10.5)

## 2018-12-13 PROCEDURE — 99232 SBSQ HOSP IP/OBS MODERATE 35: CPT | Mod: GC

## 2018-12-13 PROCEDURE — 71045 X-RAY EXAM CHEST 1 VIEW: CPT | Mod: 26

## 2018-12-13 RX ORDER — FUROSEMIDE 40 MG
40 TABLET ORAL DAILY
Qty: 0 | Refills: 0 | Status: DISCONTINUED | OUTPATIENT
Start: 2018-12-13 | End: 2018-12-15

## 2018-12-13 RX ORDER — POTASSIUM CHLORIDE 20 MEQ
20 PACKET (EA) ORAL DAILY
Qty: 0 | Refills: 0 | Status: DISCONTINUED | OUTPATIENT
Start: 2018-12-13 | End: 2018-12-15

## 2018-12-13 RX ORDER — DOXAZOSIN MESYLATE 4 MG
2 TABLET ORAL AT BEDTIME
Qty: 0 | Refills: 0 | Status: DISCONTINUED | OUTPATIENT
Start: 2018-12-13 | End: 2018-12-15

## 2018-12-13 RX ORDER — METOPROLOL TARTRATE 50 MG
12.5 TABLET ORAL ONCE
Qty: 0 | Refills: 0 | Status: COMPLETED | OUTPATIENT
Start: 2018-12-13 | End: 2018-12-13

## 2018-12-13 RX ORDER — CHLORHEXIDINE GLUCONATE 213 G/1000ML
1 SOLUTION TOPICAL DAILY
Qty: 0 | Refills: 0 | Status: DISCONTINUED | OUTPATIENT
Start: 2018-12-13 | End: 2018-12-15

## 2018-12-13 RX ORDER — WARFARIN SODIUM 2.5 MG/1
4 TABLET ORAL ONCE
Qty: 0 | Refills: 0 | Status: DISCONTINUED | OUTPATIENT
Start: 2018-12-13 | End: 2018-12-13

## 2018-12-13 RX ORDER — METOPROLOL TARTRATE 50 MG
12.5 TABLET ORAL
Qty: 0 | Refills: 0 | Status: DISCONTINUED | OUTPATIENT
Start: 2018-12-13 | End: 2018-12-13

## 2018-12-13 RX ORDER — DOCUSATE SODIUM 100 MG
100 CAPSULE ORAL THREE TIMES A DAY
Qty: 0 | Refills: 0 | Status: DISCONTINUED | OUTPATIENT
Start: 2018-12-13 | End: 2018-12-15

## 2018-12-13 RX ORDER — METOPROLOL TARTRATE 50 MG
25 TABLET ORAL EVERY 12 HOURS
Qty: 0 | Refills: 0 | Status: DISCONTINUED | OUTPATIENT
Start: 2018-12-14 | End: 2018-12-14

## 2018-12-13 RX ORDER — WARFARIN SODIUM 2.5 MG/1
3.5 TABLET ORAL ONCE
Qty: 0 | Refills: 0 | Status: COMPLETED | OUTPATIENT
Start: 2018-12-13 | End: 2018-12-13

## 2018-12-13 RX ORDER — SODIUM CHLORIDE 9 MG/ML
3 INJECTION INTRAMUSCULAR; INTRAVENOUS; SUBCUTANEOUS EVERY 8 HOURS
Qty: 0 | Refills: 0 | Status: DISCONTINUED | OUTPATIENT
Start: 2018-12-13 | End: 2018-12-15

## 2018-12-13 RX ADMIN — SODIUM CHLORIDE 3 MILLILITER(S): 9 INJECTION INTRAMUSCULAR; INTRAVENOUS; SUBCUTANEOUS at 14:40

## 2018-12-13 RX ADMIN — FINASTERIDE 5 MILLIGRAM(S): 5 TABLET, FILM COATED ORAL at 11:08

## 2018-12-13 RX ADMIN — Medication 40 MILLIGRAM(S): at 22:08

## 2018-12-13 RX ADMIN — HEPARIN SODIUM 5000 UNIT(S): 5000 INJECTION INTRAVENOUS; SUBCUTANEOUS at 22:08

## 2018-12-13 RX ADMIN — Medication 20 MILLIEQUIVALENT(S): at 11:07

## 2018-12-13 RX ADMIN — Medication 12.5 MILLIGRAM(S): at 18:05

## 2018-12-13 RX ADMIN — CLOPIDOGREL BISULFATE 75 MILLIGRAM(S): 75 TABLET, FILM COATED ORAL at 11:08

## 2018-12-13 RX ADMIN — POLYETHYLENE GLYCOL 3350 17 GRAM(S): 17 POWDER, FOR SOLUTION ORAL at 22:08

## 2018-12-13 RX ADMIN — Medication 12.5 MILLIGRAM(S): at 17:26

## 2018-12-13 RX ADMIN — ATORVASTATIN CALCIUM 80 MILLIGRAM(S): 80 TABLET, FILM COATED ORAL at 22:08

## 2018-12-13 RX ADMIN — Medication 100 MILLIGRAM(S): at 06:14

## 2018-12-13 RX ADMIN — HEPARIN SODIUM 5000 UNIT(S): 5000 INJECTION INTRAVENOUS; SUBCUTANEOUS at 06:13

## 2018-12-13 RX ADMIN — PANTOPRAZOLE SODIUM 40 MILLIGRAM(S): 20 TABLET, DELAYED RELEASE ORAL at 06:14

## 2018-12-13 RX ADMIN — Medication 0.12 MILLIGRAM(S): at 07:23

## 2018-12-13 RX ADMIN — WARFARIN SODIUM 3.5 MILLIGRAM(S): 2.5 TABLET ORAL at 22:08

## 2018-12-13 RX ADMIN — Medication 100 MILLIGRAM(S): at 13:57

## 2018-12-13 RX ADMIN — SODIUM CHLORIDE 3 MILLILITER(S): 9 INJECTION INTRAMUSCULAR; INTRAVENOUS; SUBCUTANEOUS at 06:23

## 2018-12-13 RX ADMIN — HEPARIN SODIUM 5000 UNIT(S): 5000 INJECTION INTRAVENOUS; SUBCUTANEOUS at 14:44

## 2018-12-13 RX ADMIN — Medication 2 MILLIGRAM(S): at 22:45

## 2018-12-13 RX ADMIN — SODIUM CHLORIDE 3 MILLILITER(S): 9 INJECTION INTRAMUSCULAR; INTRAVENOUS; SUBCUTANEOUS at 21:56

## 2018-12-13 RX ADMIN — Medication 100 MILLIGRAM(S): at 22:08

## 2018-12-13 RX ADMIN — Medication 12.5 MILLIGRAM(S): at 06:14

## 2018-12-13 RX ADMIN — Medication 81 MILLIGRAM(S): at 11:08

## 2018-12-13 RX ADMIN — CHLORHEXIDINE GLUCONATE 1 APPLICATION(S): 213 SOLUTION TOPICAL at 23:28

## 2018-12-13 NOTE — PROGRESS NOTE ADULT - SUBJECTIVE AND OBJECTIVE BOX
HPI:  84 y/o M, former smoker, walks with cane, with PMHx HTN, CAD s/p prior MI (denies history of stents), CHF (on Lasix BID, EF unknown), atrial fibrillation/flutter on Coumadin (last dose 12/4/18), PUD (no history of GI bleeding), recently diagnosed KOKO (does not yet have machine for home), multiple falls in the last 2 months, ?CKD (Cr 1.30 in October 2018), who initially presented to emergency room in [facility] c/o worsening shortness of breath with palpitations. He has been experiencing shortness of breath with chest tightness for the last 2 months. He visited his primary doctor who adjusted his medications which improved his LE edema (patient reports chronic LE edema with blistering) but his SOB persisted. He can only walk < 1 block before he must stop secondary to SOB and chest tightness. He also reports difficulty sleeping/lying flat at night and frequently has only been able to sleep sitting up in a chair recently. He denies n/v, diaphoresis, lightheadedness, PND, or syncope. He had CT Chest/Calcium score performed 10/17/18 showed calcium score 2446; LM 0, , LAD 1094, LCx 615, The CTA with contrast was not performed as it was contraindicated given the extensive coronary calcium score and the irregular heartbeats due to atrial fibrillation. Also of note, CT Chest 10/20/18 showed 3.5x3.1cm infra-renal abdominal aortic aneurysm. Labs from 10/22/18 showed platelets 32. BUN/Cr 25/1.30. In light of patient’s risk factors, CCS Class III-IV angina equivalent symptoms, and extensive coronary calcium score, he is recommended for cardiac catheterization with possible intervention if clinically indicated. (05 Dec 2018 12:01)    FAMILY HISTORY:  Family history of diabetes mellitus (Sibling)    MEDICATIONS  (STANDING):  aspirin enteric coated 81 milliGRAM(s) Oral daily  atorvastatin 80 milliGRAM(s) Oral at bedtime  chlorhexidine 2% Cloths 1 Application(s) Topical daily  clopidogrel Tablet 75 milliGRAM(s) Oral daily  diltiazem    Tablet 60 milliGRAM(s) Oral every 8 hours  docusate sodium 100 milliGRAM(s) Oral three times a day  doxazosin 2 milliGRAM(s) Oral at bedtime  finasteride 5 milliGRAM(s) Oral daily  furosemide    Tablet 40 milliGRAM(s) Oral daily  heparin  Injectable 5000 Unit(s) SubCutaneous every 8 hours  metoprolol tartrate 12.5 milliGRAM(s) Oral <User Schedule>  pantoprazole    Tablet 40 milliGRAM(s) Oral before breakfast  polyethylene glycol 3350 17 Gram(s) Oral at bedtime  potassium chloride    Tablet ER 20 milliEquivalent(s) Oral daily  sodium chloride 0.9% lock flush 3 milliLiter(s) IV Push every 8 hours  warfarin 4 milliGRAM(s) Oral once    MEDICATIONS  (PRN):  glucagon  Injectable 1 milliGRAM(s) IntraMuscular once PRN Glucose LESS THAN 70 milligrams/deciliter    Vital Signs Last 24 Hrs  T(C): 36.1 (13 Dec 2018 05:01), Max: 36.6 (12 Dec 2018 17:18)  T(F): 97 (13 Dec 2018 05:01), Max: 97.9 (12 Dec 2018 17:18)  HR: 90 (13 Dec 2018 08:00) (84 - 132)  BP: 118/64 (13 Dec 2018 08:00) (109/68 - 130/70)  BP(mean): 78 (13 Dec 2018 08:00) (78 - 86)  RR: 22 (13 Dec 2018 08:00) (9 - 32)  SpO2: 98% (13 Dec 2018 08:00) (89% - 100%)    Physical exam:    Overall impression  Lymphadenopathy  Liver  spleen    Labs:  CBC Full  -  ( 13 Dec 2018 03:39 )  WBC Count : 8.5 K/uL  Hemoglobin : 11.9 g/dL  Hematocrit : 37.2 %  Platelet Count - Automated : 79 K/uL  Mean Cell Volume : 89.0 fL  Mean Cell Hemoglobin : 28.5 pg  Mean Cell Hemoglobin Concentration : 32.0 g/dL  Auto Neutrophil # : x  Auto Lymphocyte # : x  Auto Monocyte # : x  Auto Eosinophil # : x  Auto Basophil # : x  Auto Neutrophil % : x  Auto Lymphocyte % : x  Auto Monocyte % : x  Auto Eosinophil % : x  Auto Basophil % : x    12-13    140  |  105  |  22  ----------------------------<  115<H>  4.1   |  29  |  1.04    Ca    9.9      13 Dec 2018 03:17  Phos  2.0     12-12  Mg     2.0     12-13    TPro  5.1<L>  /  Alb  3.0<L>  /  TBili  0.8  /  DBili  x   /  AST  16  /  ALT  8<L>  /  AlkPhos  49  12-13      Radiology:  HEALTH ISSUES - R/O PROBLEM Dx:      Assessmant / Problems  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

## 2018-12-13 NOTE — PROGRESS NOTE ADULT - ATTENDING COMMENTS
Patient seen and examined with house-staff during bedside rounds.  Resident note read, including vitals, physical findings, laboratory data, and radiological reports.   Revisions included below.  Direct personal management at bed side and extensive interpretation of the data.  Plan was outlined and discussed in details with the housestaff.  Decision making of high complexity  Action taken for acute disease activity to reflect the level of care provided:  - medication reconciliation  - review laboratory data  No clinical evidence of pneumonia. A chest x-ray finding consistent with postoperative changes with atelectasis and pleural effusion. Patient is clinically improving. Patient does not have CPAP home he is traveling to [place] back home and [facility]

## 2018-12-13 NOTE — PROGRESS NOTE ADULT - NSHPATTENDINGPLANDISCUSS_GEN_ALL_CORE
CtU attending
the patient and 5LA care team
the patient and 5LA care team
Dr. Sweet
medical team
the patient, his family and 5U care team on 12/10/18AM
CTS
CTS
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
## 2018-12-13 NOTE — PROGRESS NOTE ADULT - SUBJECTIVE AND OBJECTIVE BOX
Acceptance from 9E    Operation / Date: 12/10/18 Robotic MIDCAB EF Nl    SUBJECTIVE ASSESSMENT:  85y Male seen and examined. Feels well, he ambulated from unit, using IS pulling 1000cc, tolerating pO diet, no BM yet. He denies fever, chest pain, palpitations, SOB, abdominal pain, n/v.     Vital Signs Last 24 Hrs  T(C): 36.7 (13 Dec 2018 10:22), Max: 36.7 (13 Dec 2018 10:22)  T(F): 98 (13 Dec 2018 10:22), Max: 98 (13 Dec 2018 10:22)  HR: 112 (13 Dec 2018 09:19) (84 - 132)  BP: 117/67 (13 Dec 2018 09:19) (102/60 - 130/70)  BP(mean): 85 (13 Dec 2018 09:19) (78 - 86)  RR: 12 (13 Dec 2018 09:19) (9 - 32)  SpO2: 96% (13 Dec 2018 09:19) (89% - 100%)  I&O's Detail    12 Dec 2018 07:01  -  13 Dec 2018 07:00  --------------------------------------------------------  IN:    IV PiggyBack: 100 mL    Oral Fluid: 120 mL    sodium chloride 0.9%: 130 mL  Total IN: 350 mL    OUT:    Chest Tube: 205 mL    Ureteral Catheter: 1290 mL  Total OUT: 1495 mL    Total NET: -1145 mL          CHEST TUBE:  No  LA DRAIN:  No.  EPICARDIAL WIRES: No.  TIE DOWNS: Yes  ALVARADO: No.    PHYSICAL EXAM:    General: Patient lying comfortably in bed, no acute distress     Neurological: Alert and oriented. No focal neurological deficits     Cardiovascular: S1S2, RRR, no murmurs appreciated on exam     Respiratory: Bibasilar crackles, no wheeze/rhonchi    Gastrointestinal: + BS, soft, non tender, non distended     Extremities: Warm and well perfused. No edema, no calf tenderness     Vascular: 2+ Peripheral pulses b/l     Incision Sites: L thoracotomy: CDI, no signs of infection/dehiscence. CT site: CDI, + tie down.     LABS:                        11.9   8.5   )-----------( 79       ( 13 Dec 2018 03:39 )             37.2       COUMADIN:  Yes afib    PT/INR - ( 13 Dec 2018 03:17 )   PT: 14.6 sec;   INR: 1.28          PTT - ( 13 Dec 2018 03:17 )  PTT:31.0 sec    12-13    140  |  105  |  22  ----------------------------<  115<H>  4.1   |  29  |  1.04    Ca    9.9      13 Dec 2018 03:17  Phos  2.0     12-12  Mg     2.0     12-13    TPro  5.1<L>  /  Alb  3.0<L>  /  TBili  0.8  /  DBili  x   /  AST  16  /  ALT  8<L>  /  AlkPhos  49  12-13          MEDICATIONS  (STANDING):  aspirin enteric coated 81 milliGRAM(s) Oral daily  atorvastatin 80 milliGRAM(s) Oral at bedtime  chlorhexidine 2% Cloths 1 Application(s) Topical daily  clopidogrel Tablet 75 milliGRAM(s) Oral daily  diltiazem    Tablet 60 milliGRAM(s) Oral every 8 hours  docusate sodium 100 milliGRAM(s) Oral three times a day  doxazosin 2 milliGRAM(s) Oral at bedtime  finasteride 5 milliGRAM(s) Oral daily  furosemide    Tablet 40 milliGRAM(s) Oral daily  heparin  Injectable 5000 Unit(s) SubCutaneous every 8 hours  metoprolol tartrate 12.5 milliGRAM(s) Oral <User Schedule>  pantoprazole    Tablet 40 milliGRAM(s) Oral before breakfast  polyethylene glycol 3350 17 Gram(s) Oral at bedtime  potassium chloride    Tablet ER 20 milliEquivalent(s) Oral daily  sodium chloride 0.9% lock flush 3 milliLiter(s) IV Push every 8 hours  warfarin 4 milliGRAM(s) Oral once    MEDICATIONS  (PRN):  glucagon  Injectable 1 milliGRAM(s) IntraMuscular once PRN Glucose LESS THAN 70 milligrams/deciliter        RADIOLOGY & ADDITIONAL TESTS: Acceptance from 9E    Operation / Date: 12/10/18 Robotic MIDCAB EF Nl    SUBJECTIVE ASSESSMENT:  85y Male seen and examined. Feels well, he ambulated from unit, using IS pulling 1000cc, tolerating pO diet, no BM yet. He denies fever, chest pain, palpitations, SOB, abdominal pain, n/v.     Vital Signs Last 24 Hrs  T(C): 36.7 (13 Dec 2018 10:22), Max: 36.7 (13 Dec 2018 10:22)  T(F): 98 (13 Dec 2018 10:22), Max: 98 (13 Dec 2018 10:22)  HR: 112 (13 Dec 2018 09:19) (84 - 132)  BP: 117/67 (13 Dec 2018 09:19) (102/60 - 130/70)  BP(mean): 85 (13 Dec 2018 09:19) (78 - 86)  RR: 12 (13 Dec 2018 09:19) (9 - 32)  SpO2: 96% (13 Dec 2018 09:19) (89% - 100%)  I&O's Detail    12 Dec 2018 07:01  -  13 Dec 2018 07:00  --------------------------------------------------------  IN:    IV PiggyBack: 100 mL    Oral Fluid: 120 mL    sodium chloride 0.9%: 130 mL  Total IN: 350 mL    OUT:    Chest Tube: 205 mL    Ureteral Catheter: 1290 mL  Total OUT: 1495 mL    Total NET: -1145 mL          CHEST TUBE:  No  LA DRAIN:  No.  EPICARDIAL WIRES: No.  TIE DOWNS: Yes  ALVARADO: No.    PHYSICAL EXAM:    General: Patient lying comfortably in bed, no acute distress     Neurological: Alert and oriented. No focal neurological deficits     Cardiovascular: S1S2, RRR, no murmurs appreciated on exam     Respiratory: Bibasilar crackles, no wheeze/rhonchi    Gastrointestinal: + BS, soft, non tender, non distended     Extremities: Warm and well perfused. No edema, no calf tenderness     Vascular: 2+ Peripheral pulses b/l     Incision Sites: L thoracotomy: CDI, no signs of infection/dehiscence. CT site: CDI, + tie down.     LABS:                        11.9   8.5   )-----------( 79       ( 13 Dec 2018 03:39 )             37.2       COUMADIN:  Yes afib    PT/INR - ( 13 Dec 2018 03:17 )   PT: 14.6 sec;   INR: 1.28          PTT - ( 13 Dec 2018 03:17 )  PTT:31.0 sec    12-13    140  |  105  |  22  ----------------------------<  115<H>  4.1   |  29  |  1.04    Ca    9.9      13 Dec 2018 03:17  Phos  2.0     12-12  Mg     2.0     12-13    TPro  5.1<L>  /  Alb  3.0<L>  /  TBili  0.8  /  DBili  x   /  AST  16  /  ALT  8<L>  /  AlkPhos  49  12-13          MEDICATIONS  (STANDING):  aspirin enteric coated 81 milliGRAM(s) Oral daily  atorvastatin 80 milliGRAM(s) Oral at bedtime  chlorhexidine 2% Cloths 1 Application(s) Topical daily  clopidogrel Tablet 75 milliGRAM(s) Oral daily  diltiazem    Tablet 60 milliGRAM(s) Oral every 8 hours  docusate sodium 100 milliGRAM(s) Oral three times a day  doxazosin 2 milliGRAM(s) Oral at bedtime  finasteride 5 milliGRAM(s) Oral daily  furosemide    Tablet 40 milliGRAM(s) Oral daily  heparin  Injectable 5000 Unit(s) SubCutaneous every 8 hours  pantoprazole    Tablet 40 milliGRAM(s) Oral before breakfast  polyethylene glycol 3350 17 Gram(s) Oral at bedtime  potassium chloride    Tablet ER 20 milliEquivalent(s) Oral daily  sodium chloride 0.9% lock flush 3 milliLiter(s) IV Push every 8 hours  warfarin 4 milliGRAM(s) Oral once    MEDICATIONS  (PRN):  glucagon  Injectable 1 milliGRAM(s) IntraMuscular once PRN Glucose LESS THAN 70 milligrams/deciliter        RADIOLOGY & ADDITIONAL TESTS:

## 2018-12-13 NOTE — PROGRESS NOTE ADULT - ASSESSMENT
86 y/o M, former smoker with PMHx HTN, CAD s/p prior MI (denies history of stents), CHF, atrial fibrillation/flutter on Coumadin (last dose 12/4/18), PUD (no history of GI bleeding), recently diagnosed KOKO, CKD, and AAA, s/p L and R cath, for CABG tomorrow, doing well on CPAP.

## 2018-12-13 NOTE — PROGRESS NOTE ADULT - PROBLEM SELECTOR PROBLEM 1
Acute on chronic diastolic congestive heart failure
CKD (chronic kidney disease)
Acute on chronic diastolic congestive heart failure
KOKO (obstructive sleep apnea)

## 2018-12-14 ENCOUNTER — TRANSCRIPTION ENCOUNTER (OUTPATIENT)
Age: 83
End: 2018-12-14

## 2018-12-14 PROBLEM — Z86.79 HISTORY OF ATRIAL FLUTTER: Status: RESOLVED | Noted: 2018-12-14 | Resolved: 2018-12-14

## 2018-12-14 PROBLEM — I50.32 CHRONIC DIASTOLIC HEART FAILURE: Status: ACTIVE | Noted: 2018-12-14

## 2018-12-14 PROBLEM — Z86.79 HISTORY OF HYPERTENSION: Status: RESOLVED | Noted: 2018-12-14 | Resolved: 2018-12-14

## 2018-12-14 PROBLEM — Z86.69 HISTORY OF NEUROPATHY: Status: RESOLVED | Noted: 2018-12-14 | Resolved: 2018-12-14

## 2018-12-14 PROBLEM — Z86.69 HISTORY OF OBSTRUCTIVE SLEEP APNEA: Status: RESOLVED | Noted: 2018-12-14 | Resolved: 2018-12-14

## 2018-12-14 PROBLEM — Z87.19 HISTORY OF SMALL BOWEL OBSTRUCTION: Status: RESOLVED | Noted: 2018-12-14 | Resolved: 2018-12-14

## 2018-12-14 PROBLEM — Z91.81 HISTORY OF FALL: Status: RESOLVED | Noted: 2018-12-14 | Resolved: 2018-12-14

## 2018-12-14 PROBLEM — Z87.11 HISTORY OF PEPTIC ULCER: Status: RESOLVED | Noted: 2018-12-14 | Resolved: 2018-12-14

## 2018-12-14 PROBLEM — Z86.39 HISTORY OF HYPERLIPIDEMIA: Status: RESOLVED | Noted: 2018-12-14 | Resolved: 2018-12-14

## 2018-12-14 PROBLEM — Z86.79 HISTORY OF ATRIAL FIBRILLATION: Status: RESOLVED | Noted: 2018-12-14 | Resolved: 2018-12-14

## 2018-12-14 PROBLEM — Z87.891 FORMER SMOKER: Status: ACTIVE | Noted: 2018-12-14
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
## 2018-12-14 PROBLEM — I25.2 HISTORY OF MYOCARDIAL INFARCTION: Status: RESOLVED | Noted: 2018-12-14 | Resolved: 2018-12-14

## 2018-12-14 LAB
ANION GAP SERPL CALC-SCNC: 11 MMOL/L — SIGNIFICANT CHANGE UP (ref 5–17)
APTT BLD: 27.4 SEC — LOW (ref 27.5–36.3)
BUN SERPL-MCNC: 25 MG/DL — HIGH (ref 7–23)
CALCIUM SERPL-MCNC: 10.3 MG/DL — SIGNIFICANT CHANGE UP (ref 8.4–10.5)
CHLORIDE SERPL-SCNC: 104 MMOL/L — SIGNIFICANT CHANGE UP (ref 96–108)
CO2 SERPL-SCNC: 29 MMOL/L — SIGNIFICANT CHANGE UP (ref 22–31)
CREAT SERPL-MCNC: 1.15 MG/DL — SIGNIFICANT CHANGE UP (ref 0.5–1.3)
GLUCOSE SERPL-MCNC: 94 MG/DL — SIGNIFICANT CHANGE UP (ref 70–99)
HCT VFR BLD CALC: 41 % — SIGNIFICANT CHANGE UP (ref 39–50)
HGB BLD-MCNC: 13.1 G/DL — SIGNIFICANT CHANGE UP (ref 13–17)
INR BLD: 1.11 — SIGNIFICANT CHANGE UP (ref 0.88–1.16)
MAGNESIUM SERPL-MCNC: 2.2 MG/DL — SIGNIFICANT CHANGE UP (ref 1.6–2.6)
MCHC RBC-ENTMCNC: 28.9 PG — SIGNIFICANT CHANGE UP (ref 27–34)
MCHC RBC-ENTMCNC: 32 G/DL — SIGNIFICANT CHANGE UP (ref 32–36)
MCV RBC AUTO: 90.3 FL — SIGNIFICANT CHANGE UP (ref 80–100)
PHOSPHATE SERPL-MCNC: 1.9 MG/DL — LOW (ref 2.5–4.5)
PLATELET # BLD AUTO: 99 K/UL — LOW (ref 150–400)
POTASSIUM SERPL-MCNC: 4.3 MMOL/L — SIGNIFICANT CHANGE UP (ref 3.5–5.3)
POTASSIUM SERPL-SCNC: 4.3 MMOL/L — SIGNIFICANT CHANGE UP (ref 3.5–5.3)
PROTHROM AB SERPL-ACNC: 12.6 SEC — SIGNIFICANT CHANGE UP (ref 10–12.9)
RBC # BLD: 4.54 M/UL — SIGNIFICANT CHANGE UP (ref 4.2–5.8)
RBC # FLD: 14.4 % — SIGNIFICANT CHANGE UP (ref 10.3–16.9)
SODIUM SERPL-SCNC: 144 MMOL/L — SIGNIFICANT CHANGE UP (ref 135–145)
WBC # BLD: 6.1 K/UL — SIGNIFICANT CHANGE UP (ref 3.8–10.5)
WBC # FLD AUTO: 6.1 K/UL — SIGNIFICANT CHANGE UP (ref 3.8–10.5)

## 2018-12-14 PROCEDURE — 71046 X-RAY EXAM CHEST 2 VIEWS: CPT | Mod: 26

## 2018-12-14 RX ORDER — ATORVASTATIN CALCIUM 80 MG/1
80 TABLET, FILM COATED ORAL
Qty: 1 | Refills: 3 | Status: ACTIVE | COMMUNITY

## 2018-12-14 RX ORDER — METOPROLOL TARTRATE 50 MG
25 TABLET ORAL EVERY 6 HOURS
Qty: 0 | Refills: 0 | Status: DISCONTINUED | OUTPATIENT
Start: 2018-12-14 | End: 2018-12-15

## 2018-12-14 RX ORDER — CLOPIDOGREL BISULFATE 75 MG/1
75 TABLET, FILM COATED ORAL DAILY
Qty: 1 | Refills: 6 | Status: ACTIVE | COMMUNITY

## 2018-12-14 RX ORDER — PANTOPRAZOLE SODIUM 40 MG/1
40 TABLET, DELAYED RELEASE ORAL
Refills: 0 | Status: ACTIVE | COMMUNITY

## 2018-12-14 RX ORDER — WARFARIN SODIUM 2.5 MG/1
4 TABLET ORAL ONCE
Qty: 0 | Refills: 0 | Status: COMPLETED | OUTPATIENT
Start: 2018-12-14 | End: 2018-12-14

## 2018-12-14 RX ORDER — ASPIRIN 81 MG
81 TABLET, DELAYED RELEASE (ENTERIC COATED) ORAL DAILY
Qty: 30 | Refills: 6 | Status: ACTIVE | COMMUNITY

## 2018-12-14 RX ORDER — DOXAZOSIN 2 MG/1
2 TABLET ORAL DAILY
Refills: 0 | Status: ACTIVE | COMMUNITY

## 2018-12-14 RX ORDER — FINASTERIDE 5 MG/1
5 TABLET, FILM COATED ORAL
Qty: 90 | Refills: 3 | Status: ACTIVE | COMMUNITY

## 2018-12-14 RX ORDER — FUROSEMIDE 40 MG
40 TABLET ORAL ONCE
Qty: 0 | Refills: 0 | Status: COMPLETED | OUTPATIENT
Start: 2018-12-14 | End: 2018-12-14

## 2018-12-14 RX ORDER — METOPROLOL TARTRATE 50 MG
2.5 TABLET ORAL ONCE
Qty: 0 | Refills: 0 | Status: COMPLETED | OUTPATIENT
Start: 2018-12-14 | End: 2018-12-14

## 2018-12-14 RX ADMIN — HEPARIN SODIUM 5000 UNIT(S): 5000 INJECTION INTRAVENOUS; SUBCUTANEOUS at 21:47

## 2018-12-14 RX ADMIN — CLOPIDOGREL BISULFATE 75 MILLIGRAM(S): 75 TABLET, FILM COATED ORAL at 12:08

## 2018-12-14 RX ADMIN — WARFARIN SODIUM 4 MILLIGRAM(S): 2.5 TABLET ORAL at 21:47

## 2018-12-14 RX ADMIN — Medication 100 MILLIGRAM(S): at 21:47

## 2018-12-14 RX ADMIN — POLYETHYLENE GLYCOL 3350 17 GRAM(S): 17 POWDER, FOR SOLUTION ORAL at 21:46

## 2018-12-14 RX ADMIN — Medication 20 MILLIEQUIVALENT(S): at 12:09

## 2018-12-14 RX ADMIN — Medication 40 MILLIGRAM(S): at 06:14

## 2018-12-14 RX ADMIN — Medication 100 MILLIGRAM(S): at 13:44

## 2018-12-14 RX ADMIN — PANTOPRAZOLE SODIUM 40 MILLIGRAM(S): 20 TABLET, DELAYED RELEASE ORAL at 06:14

## 2018-12-14 RX ADMIN — SODIUM CHLORIDE 3 MILLILITER(S): 9 INJECTION INTRAMUSCULAR; INTRAVENOUS; SUBCUTANEOUS at 13:44

## 2018-12-14 RX ADMIN — Medication 10 MILLIGRAM(S): at 18:11

## 2018-12-14 RX ADMIN — ATORVASTATIN CALCIUM 80 MILLIGRAM(S): 80 TABLET, FILM COATED ORAL at 21:47

## 2018-12-14 RX ADMIN — FINASTERIDE 5 MILLIGRAM(S): 5 TABLET, FILM COATED ORAL at 12:08

## 2018-12-14 RX ADMIN — SODIUM CHLORIDE 3 MILLILITER(S): 9 INJECTION INTRAMUSCULAR; INTRAVENOUS; SUBCUTANEOUS at 05:27

## 2018-12-14 RX ADMIN — Medication 25 MILLIGRAM(S): at 06:14

## 2018-12-14 RX ADMIN — SODIUM CHLORIDE 3 MILLILITER(S): 9 INJECTION INTRAMUSCULAR; INTRAVENOUS; SUBCUTANEOUS at 22:00

## 2018-12-14 RX ADMIN — Medication 100 MILLIGRAM(S): at 06:14

## 2018-12-14 RX ADMIN — Medication 81 MILLIGRAM(S): at 12:08

## 2018-12-14 RX ADMIN — Medication 2.5 MILLIGRAM(S): at 16:02

## 2018-12-14 RX ADMIN — HEPARIN SODIUM 5000 UNIT(S): 5000 INJECTION INTRAVENOUS; SUBCUTANEOUS at 13:44

## 2018-12-14 RX ADMIN — Medication 40 MILLIGRAM(S): at 18:10

## 2018-12-14 RX ADMIN — Medication 25 MILLIGRAM(S): at 18:12

## 2018-12-14 RX ADMIN — HEPARIN SODIUM 5000 UNIT(S): 5000 INJECTION INTRAVENOUS; SUBCUTANEOUS at 06:14

## 2018-12-14 RX ADMIN — Medication 2 MILLIGRAM(S): at 21:47

## 2018-12-14 NOTE — PROGRESS NOTE ADULT - PROVIDER SPECIALTY LIST ADULT
CT Surgery
Cardiology
Critical Care
Heme/Onc
Intervent Cardiology
Nephrology
Pulmonology
Heme/Onc

## 2018-12-14 NOTE — DISCHARGE NOTE ADULT - CARE PLAN
Principal Discharge DX:	Coronary artery disease  Goal:	Recover from Surgery  Assessment and plan of treatment:	-Please follow up with Dr. Arnol Perez on 12/8/18 at 11:30am.  The office is located at [facility], [facility], 4th floor. Call us with any questions  #[phone].    -You will also follow-up with Dr. Jesús Carlisle on 12/18/18 at 11:30am.  His office is located at [address] [place] [address], 9th Floor.  Phone [phone].   -Walk daily as tolerated and use your incentive spirometer every hour.    -No driving or strenuous activity/exercise for 6 weeks, or until  cleared by your surgeon.    -Gently clean your incisions with anti-bacterial soap and water, pat  dry.  You may leave them open to air.    -Call your doctor if you have shortness of breath, chest pain not  relieved by pain medication, dizziness, fever >101.5, or increased  redness or drainage from incisions.  Secondary Diagnosis:	Afib  Goal:	Coumadin  Assessment and plan of treatment:	You are being discharged on a medication called Coumadin (Warfarin).  This medication is a blood thinning medication and is monitored by frequent blood checks to ensure that your blood is not too thick or too thin.  You were scheduled a follow-up appointment to have your blood drawn for monitoring and you must keep this appointment at the scheduled time as missing a blood draw can result in your coumadin levels being too high (meaning your blood becomes too thin and you risk serious, life threatening bleeds), or too low (meaning that your blood becomes too thick and you have a higher risk of clots and stroke).     Dr. Carlisle will follow-up on your blood thinning levels and make adjustments to your coumadin afterward.  He will see you in his office in [place] on Tuesday, 12/18/18 at 11:30am and labs will be done at that time.  Secondary Diagnosis:	KOKO (obstructive sleep apnea)  Goal:	CPAP  Assessment and plan of treatment:	You were diagnosed with Obstructive Sleep Apnea (KOKO) prior to your surgery and were prescribed CPAP for home use.  Please ensure that his prescription is filled and your CPAP has been delivered to your home prior to returning home.      Additionally, you were noted to have lower oxygen levels while at rest and were sent home with oxygen.  Please ensure that you use this oxygen as needed and have it available when you are away from home to avoid becoming short of breath while walking.  Oxygen is very flammable so you should avoid open flames and no smoking around your oxygen tank.  Goal:	Medications  Assessment and plan of treatment:	It is very important that you continue your medications as prescribed after you are discharged.  You should refer to the medication reconciliation form provided to you on your discharge to information as to what medications to take and when.      Additionally, your medications have been sent to a pharmacy.  These medications should be picked up from your pharmacy on the DAY OF DISCHARGE.  This helps to ensure that you have the appropriate medications available while at home.  If you have any issues obtaining your medications on the day of your discharge, please call [phone] for further assistance.

## 2018-12-14 NOTE — DISCHARGE NOTE ADULT - MEDICATION SUMMARY - MEDICATIONS TO CHANGE
I will SWITCH the dose or number of times a day I take the medications listed below when I get home from the hospital:    Lasix 40 mg oral tablet  -- orally 2 times a day    Coumadin  -- take 4mg Monday to Saturday and 5mg on Sunday

## 2018-12-14 NOTE — PROGRESS NOTE ADULT - ASSESSMENT
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 In light of patient’s risk factors, CCS Class III-IV angina equivalent symptoms, and extensive coronary calcium score, he underwent a cardiac cath which revealed pLAD 100% with R-L collaterals mid PSB482%, and LCx 30%. CT surgery, Dr. Perez consulted and on 12/10/18 patient underwent Robotic MIDCAB EF Nl. : OR uncomplicated.  Extubated POD 0.  Hx of afib, cardizem started on POD 2 for rate control, coumadin started for AF.  POD 3 transferred to floor where he has remained on 2L NC for baseline hypoxia.  He is now POD 4, no acute events overnight.     Plan:  Neurovascular: No delirium. Pain well controlled with current regimen.  -Tylenol prn pain    Cardiovascular: Hemodynamically stable. HR controlled.  -HX HTN, CAD s/p prior MI (denies history of stents), dCHF, atrial fibrillation/flutter on Coumadin, now s/p MIDCAB EF Nl  -continue cardizem 60gm q8, metoprolol 25mg q6h (titrate as tolerated), asa 81mg daily, plavix 75mg daily, lasix 40mg PO daily, atorvastatin 80mg qhs  -coumadin 4mg tonight for Afib.  Goal INR goal 2-3  -Montior hr/bp/tele    Respiratory: Saturates well on 2L NC, desaturates to <88% while on RA with ambulation. Recent dx of KOKO  -Home O2 arranged, will be delivered prior to discharge tomorrow.   -Encourage ambulation, C+DB and Use of IS 10x / hr while awake.  -Pulm following appreciate recs  -wean O2    GI: Stable.  -protonix for GI PPX.  -Continue bowel regimen  -PO Diet.    Renal / : BUN/Cr CKD stage III 11/1.04, (baseline 1.3)  -Monitor renal function.  -Monitor I/O's.  -Replete lytes PRN  -jordan out, f/u TOV  -Continue proscar/doxazosin for BPH  -Renal following appreciate recs    Endocrine:    -A1c 5.3-continue ISS, monitor FS  -TSH low, free t4 wnl    Hematologic: H&H 13.1/41, PLTs stable at 79  -f/u AM CBC  -heme onc following for thrombocytopenia, appreciate recs    ID: Afebrile, WBC 8.5  -Observe for SIRS/Sepsis Syndrome.    Prophylaxis:  -DVT prophylaxis with 5000 SubQ Heparin q8h, will stop when INR >2  -SCD's    Disposition:  -Home when medically ready

## 2018-12-14 NOTE — DISCHARGE NOTE ADULT - NS AS ACTIVITY OBS
Do not make important decisions/Walking-Outdoors allowed/Walking-Indoors allowed/No Heavy lifting/straining/Showering allowed

## 2018-12-14 NOTE — PROGRESS NOTE ADULT - SUBJECTIVE AND OBJECTIVE BOX
HPI:  86 y/o M, former smoker, walks with cane, with PMHx HTN, CAD s/p prior MI (denies history of stents), CHF (on Lasix BID, EF unknown), atrial fibrillation/flutter on Coumadin (last dose 12/4/18), PUD (no history of GI bleeding), recently diagnosed KOKO (does not yet have machine for home), multiple falls in the last 2 months, ?CKD (Cr 1.30 in October 2018), who initially presented to emergency room in [facility] c/o worsening shortness of breath with palpitations. He has been experiencing shortness of breath with chest tightness for the last 2 months. He visited his primary doctor who adjusted his medications which improved his LE edema (patient reports chronic LE edema with blistering) but his SOB persisted. He can only walk < 1 block before he must stop secondary to SOB and chest tightness. He also reports difficulty sleeping/lying flat at night and frequently has only been able to sleep sitting up in a chair recently. He denies n/v, diaphoresis, lightheadedness, PND, or syncope. He had CT Chest/Calcium score performed 10/17/18 showed calcium score 2446; LM 0, , LAD 1094, LCx 615, The CTA with contrast was not performed as it was contraindicated given the extensive coronary calcium score and the irregular heartbeats due to atrial fibrillation. Also of note, CT Chest 10/20/18 showed 3.5x3.1cm infra-renal abdominal aortic aneurysm. Labs from 10/22/18 showed platelets 32. BUN/Cr 25/1.30. In light of patient’s risk factors, CCS Class III-IV angina equivalent symptoms, and extensive coronary calcium score, he is recommended for cardiac catheterization with possible intervention if clinically indicated. (05 Dec 2018 12:01)    FAMILY HISTORY:  Family history of diabetes mellitus (Sibling)    MEDICATIONS  (STANDING):  aspirin enteric coated 81 milliGRAM(s) Oral daily  atorvastatin 80 milliGRAM(s) Oral at bedtime  chlorhexidine 2% Cloths 1 Application(s) Topical daily  clopidogrel Tablet 75 milliGRAM(s) Oral daily  diltiazem    Tablet 60 milliGRAM(s) Oral every 8 hours  docusate sodium 100 milliGRAM(s) Oral three times a day  doxazosin 2 milliGRAM(s) Oral at bedtime  finasteride 5 milliGRAM(s) Oral daily  furosemide    Tablet 40 milliGRAM(s) Oral daily  heparin  Injectable 5000 Unit(s) SubCutaneous every 8 hours  metoprolol tartrate 25 milliGRAM(s) Oral every 6 hours  pantoprazole    Tablet 40 milliGRAM(s) Oral before breakfast  polyethylene glycol 3350 17 Gram(s) Oral at bedtime  potassium chloride    Tablet ER 20 milliEquivalent(s) Oral daily  sodium chloride 0.9% lock flush 3 milliLiter(s) IV Push every 8 hours    MEDICATIONS  (PRN):  bisacodyl Suppository 10 milliGRAM(s) Rectal daily PRN Constipation  glucagon  Injectable 1 milliGRAM(s) IntraMuscular once PRN Glucose LESS THAN 70 milligrams/deciliter    Vital Signs Last 24 Hrs  T(C): 36.2 (14 Dec 2018 22:13), Max: 36.6 (14 Dec 2018 17:09)  T(F): 97.2 (14 Dec 2018 22:13), Max: 97.9 (14 Dec 2018 17:09)  HR: 100 (14 Dec 2018 20:15) (90 - 120)  BP: 150/71 (14 Dec 2018 20:15) (102/73 - 150/71)  BP(mean): 98 (14 Dec 2018 20:15) (92 - 99)  RR: 22 (14 Dec 2018 20:15) (16 - 26)  SpO2: 99% (14 Dec 2018 19:38) (89% - 99%)    Physical exam:    Overall impression  Lymphadenopathy  Liver  spleen    Labs:  CBC Full  -  ( 14 Dec 2018 06:14 )  WBC Count : 6.1 K/uL  Hemoglobin : 13.1 g/dL  Hematocrit : 41.0 %  Platelet Count - Automated : 99 K/uL  Mean Cell Volume : 90.3 fL  Mean Cell Hemoglobin : 28.9 pg  Mean Cell Hemoglobin Concentration : 32.0 g/dL  Auto Neutrophil # : x  Auto Lymphocyte # : x  Auto Monocyte # : x  Auto Eosinophil # : x  Auto Basophil # : x  Auto Neutrophil % : x  Auto Lymphocyte % : x  Auto Monocyte % : x  Auto Eosinophil % : x  Auto Basophil % : x    12-14    144  |  104  |  25<H>  ----------------------------<  94  4.3   |  29  |  1.15    Ca    10.3      14 Dec 2018 06:14  Phos  1.9     12-14  Mg     2.2     12-14    TPro  5.1<L>  /  Alb  3.0<L>  /  TBili  0.8  /  DBili  x   /  AST  16  /  ALT  8<L>  /  AlkPhos  49  12-13      Radiology:  HEALTH ISSUES - R/O PROBLEM Dx:      Assessmant / Problems  Thrombocytopenia 2/2 platelet clumping  stable /slowly increasing platelet number [id number]  Continue current management    Thank you  Sonam Sneed MD

## 2018-12-14 NOTE — DISCHARGE NOTE ADULT - MEDICATION SUMMARY - MEDICATIONS TO TAKE
I will START or STAY ON the medications listed below when I get home from the hospital:    Home Oxygen- 2 liters  -- 1 application buccally once a day   -- Indication: For Oxygen    Proscar 5 mg oral tablet  -- 1 tab(s) by mouth once a day  -- Indication: For Prostate    aspirin 81 mg oral delayed release tablet  -- 1 tab(s) by mouth once a day  -- Indication: For Blood thinner    Tylenol 325 mg oral tablet  -- 2 tab(s) by mouth every 6 hours, As Needed -for moderate pain MDD:8  -- This product contains acetaminophen.  Do not use  with any other product containing acetaminophen to prevent possible liver damage.    -- Indication: For Pain    Percocet 5/325 oral tablet  -- 1 tab(s) by mouth every 6 hours, As Needed -for severe pain MDD:4   -- Caution federal law prohibits the transfer of this drug to any person other  than the person for whom it was prescribed.  May cause drowsiness.  Alcohol may intensify this effect.  Use care when operating dangerous machinery.  This prescription cannot be refilled.  This product contains acetaminophen.  Do not use  with any other product containing acetaminophen to prevent possible liver damage.  Using more of this medication than prescribed may cause serious breathing problems.    -- Indication: For Severe Pain    terazosin 2 mg oral capsule  -- 1 cap(s) by mouth once a day (at bedtime)  -- Indication: For Prostate    dilTIAZem 180 mg/24 hours oral capsule, extended release  -- 1 cap(s) by mouth once a day  -- Indication: For Heart rate control    Coumadin 4 mg oral tablet  -- 1 tab(s) by mouth once a day   Dose to be adjusted per Dr. Carlisle  -- Do not take this drug if you are pregnant.  It is very important that you take or use this exactly as directed.  Do not skip doses or discontinue unless directed by your doctor.  Obtain medical advice before taking any non-prescription drugs as some may affect the action of this medication.    -- Indication: For Blood thinner, follow-up with Dr. Carlisle for blood check    atorvastatin 80 mg oral tablet  -- 1 tab(s) by mouth once a day (at bedtime)  -- Indication: For Cholesterol    clopidogrel 75 mg oral tablet  -- 1 tab(s) by mouth once a day  -- Indication: For Blood thinner    metoprolol tartrate 50 mg oral tablet  -- 1 tab(s) by mouth every 12 hours   -- It is very important that you take or use this exactly as directed.  Do not skip doses or discontinue unless directed by your doctor.  May cause drowsiness.  Alcohol may intensify this effect.  Use care when operating dangerous machinery.  Some non-prescription drugs may aggravate your condition.  Read all labels carefully.  If a warning appears, check with your doctor before taking.  Take with food or milk.  This drug may impair the ability to drive or operate machinery.  Use care until you become familiar with its effects.    -- Indication: For Blood pressure    furosemide 40 mg oral tablet  -- 1 tab(s) by mouth once a day    Take with potassium  -- Indication: For Water removal pill, take with potassium    docusate sodium 100 mg oral capsule  -- 1 cap(s) by mouth 3 times a day  -- Indication: For Stool softener    bisacodyl 5 mg oral delayed release tablet  -- 1 tab(s) by mouth once a day  -- Indication: For Stool softener    potassium chloride 20 mEq oral tablet, extended release  -- 1 tab(s) by mouth once a day    TAke with lasix   -- Indication: For Potassium, take with lasix    pantoprazole 40 mg oral delayed release tablet  -- 1 tab(s) by mouth once a day (before a meal)  -- Indication: For Ulcer prevention

## 2018-12-14 NOTE — DISCHARGE NOTE ADULT - CARE PROVIDER_API CALL
Arnol Perez), Cardiovascular Surgery  [address]  [address] [place] [address]  Phone: [phone]  Fax: [phone]    Jesús Carlisle (MD), Cardiology; Interventional Cardiology  [address] [address]  Phone: [phone]  Fax: [phone]

## 2018-12-14 NOTE — DISCHARGE NOTE ADULT - PLAN OF CARE
Recover from Surgery -Please follow up with Dr. Arnol Perez on 12/8/18 at 11:30am.  The office is located at [facility], [facility], 4th floor. Call us with any questions  #[phone].    -You will also follow-up with Dr. Jesús Carlisle on 12/18/18 at 11:30am.  His office is located at [address] [place] [address], 9th Floor.  Phone [phone].   -Walk daily as tolerated and use your incentive spirometer every hour.    -No driving or strenuous activity/exercise for 6 weeks, or until  cleared by your surgeon.    -Gently clean your incisions with anti-bacterial soap and water, pat  dry.  You may leave them open to air.    -Call your doctor if you have shortness of breath, chest pain not  relieved by pain medication, dizziness, fever >101.5, or increased  redness or drainage from incisions. Coumadin You are being discharged on a medication called Coumadin (Warfarin).  This medication is a blood thinning medication and is monitored by frequent blood checks to ensure that your blood is not too thick or too thin.  You were scheduled a follow-up appointment to have your blood drawn for monitoring and you must keep this appointment at the scheduled time as missing a blood draw can result in your coumadin levels being too high (meaning your blood becomes too thin and you risk serious, life threatening bleeds), or too low (meaning that your blood becomes too thick and you have a higher risk of clots and stroke).     Dr. Carlisle will follow-up on your blood thinning levels and make adjustments to your coumadin afterward.  He will see you in his office in [place] on Tuesday, 12/18/18 at 11:30am and labs will be done at that time. CPAP You were diagnosed with Obstructive Sleep Apnea (KOKO) prior to your surgery and were prescribed CPAP for home use.  Please ensure that his prescription is filled and your CPAP has been delivered to your home prior to returning home.      Additionally, you were noted to have lower oxygen levels while at rest and were sent home with oxygen.  Please ensure that you use this oxygen as needed and have it available when you are away from home to avoid becoming short of breath while walking.  Oxygen is very flammable so you should avoid open flames and no smoking around your oxygen tank. Medications It is very important that you continue your medications as prescribed after you are discharged.  You should refer to the medication reconciliation form provided to you on your discharge to information as to what medications to take and when.      Additionally, your medications have been sent to a pharmacy.  These medications should be picked up from your pharmacy on the DAY OF DISCHARGE.  This helps to ensure that you have the appropriate medications available while at home.  If you have any issues obtaining your medications on the day of your discharge, please call [phone] for further assistance.

## 2018-12-14 NOTE — DISCHARGE NOTE ADULT - PATIENT PORTAL LINK FT
You can access the [facility] Patient Portal, offered by [facility], by registering with the following website: [email or web address]

## 2018-12-14 NOTE — DISCHARGE NOTE ADULT - FINDINGS/TREATMENT
CABG  Aspirin               [x  ] Yes  [  ] Contraindicated, Reason_______________________________  Beta-Blocker     [  x] Yes  [  ]Contraindicated, Reason_______________________________  Statin                 [ x ] Yes  [  ] Contraindicated, Reason_______________________________

## 2018-12-14 NOTE — PROGRESS NOTE ADULT - SUBJECTIVE AND OBJECTIVE BOX
Patient discussed on morning rounds with Dr. Perez    Operation / Date: Robotic MIDCAB on 12/10/18    SUBJECTIVE ASSESSMENT:  85y Male seen at bedside this AM.  He feels well today and pain is well controlled but he continues to have SOB while ambulating and at rest, improved with NC.  Denies HA, AMS, CP, palpitations, cough, hemoptysis, n/v/d, fever.     Vital Signs Last 24 Hrs  T(C): 36.4 (14 Dec 2018 13:48), Max: 36.7 (13 Dec 2018 17:05)  T(F): 97.5 (14 Dec 2018 13:48), Max: 98 (13 Dec 2018 17:05)  HR: 120 (14 Dec 2018 16:09) (90 - 121)  BP: 128/79 (14 Dec 2018 16:09) (102/73 - 136/69)  BP(mean): 94 (14 Dec 2018 16:09) (86 - 94)  RR: 22 (14 Dec 2018 12:10) (16 - 22)  SpO2: 89% (14 Dec 2018 16:09) (89% - 98%)  I&O's Detail    13 Dec 2018 07:01  -  14 Dec 2018 07:00  --------------------------------------------------------  IN:    Oral Fluid: 240 mL  Total IN: 240 mL    OUT:    Voided: 2025 mL  Total OUT: 2025 mL    Total NET: -1785 mL      14 Dec 2018 07:01  -  14 Dec 2018 16:53  --------------------------------------------------------  IN:  Total IN: 0 mL    OUT:    Voided: 100 mL  Total OUT: 100 mL    Total NET: -100 mL    CHEST TUBE:  No.   LA DRAIN:  No.  EPICARDIAL WIRES: No.  TIE DOWNS: Yes.  ALVARADO: No.     PHYSICAL EXAM:  General: OOB to chair, no acute distress.   Neurological: AAOx3, no AMS or focal deficits.   Cardiovascular: RRR, S1/S2, no m/r/g.   Respiratory: No acute distress on 2L NC, CTA b/l, no w/r/r.   Gastrointestinal: ND, NBS, non-TTP.  Extremities: Warm and well perfused, no calf ttp b/l.  No edema b/l.   Vascular: Pulses 2+ throughout.   Incision sites: L Mini-thoracotomy: C/D/I    LABS:                        13.1   6.1   )-----------( 99       ( 14 Dec 2018 06:14 )             41.0       COUMADIN:  Yes- 4mg tonight for goal INR 2-3    PT/INR - ( 14 Dec 2018 06:14 )   PT: 12.6 sec;   INR: 1.11          PTT - ( 14 Dec 2018 06:14 )  PTT:27.4 sec    12-14    144  |  104  |  25<H>  ----------------------------<  94  4.3   |  29  |  1.15    Ca    10.3      14 Dec 2018 06:14  Phos  1.9     12-14  Mg     2.2     12-14    TPro  5.1<L>  /  Alb  3.0<L>  /  TBili  0.8  /  DBili  x   /  AST  16  /  ALT  8<L>  /  AlkPhos  49  12-13      MEDICATIONS  (STANDING):  aspirin enteric coated 81 milliGRAM(s) Oral daily  atorvastatin 80 milliGRAM(s) Oral at bedtime  chlorhexidine 2% Cloths 1 Application(s) Topical daily  clopidogrel Tablet 75 milliGRAM(s) Oral daily  diltiazem    Tablet 60 milliGRAM(s) Oral every 8 hours  docusate sodium 100 milliGRAM(s) Oral three times a day  doxazosin 2 milliGRAM(s) Oral at bedtime  finasteride 5 milliGRAM(s) Oral daily  furosemide    Tablet 40 milliGRAM(s) Oral daily  heparin  Injectable 5000 Unit(s) SubCutaneous every 8 hours  metoprolol tartrate 25 milliGRAM(s) Oral every 12 hours  pantoprazole    Tablet 40 milliGRAM(s) Oral before breakfast  polyethylene glycol 3350 17 Gram(s) Oral at bedtime  potassium chloride    Tablet ER 20 milliEquivalent(s) Oral daily  sodium chloride 0.9% lock flush 3 milliLiter(s) IV Push every 8 hours    MEDICATIONS  (PRN):  bisacodyl 5 milliGRAM(s) Oral every 12 hours PRN Constipation  glucagon  Injectable 1 milliGRAM(s) IntraMuscular once PRN Glucose LESS THAN 70 milligrams/deciliter      RADIOLOGY & ADDITIONAL TESTS:  < from: Xray Chest 2 Views PA/Lat (12.14.18 @ 09:49) >  EXAM:  XR CHEST PA LAT 2V                          PROCEDURE DATE:  12/14/2018          INTERPRETATION:  XR CHEST PA LAT 2V dated 12/14/2018 9:49 AM    CLINICAL INFORMATION: post op    COMPARISON: December 13, 2018    FINDINGS: Heart size is stable. Decreased opacity at the right base.   Stable moderate size left pleural effusion. Mild bibasilar atelectasis.   No pneumothorax.    IMPRESSION: Improved opacity at the right base. Stable moderate-sized   left pleural effusion.    < end of copied text >

## 2018-12-14 NOTE — DISCHARGE NOTE ADULT - HOSPITAL COURSE
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 In light of patient’s risk factors, CCS Class III-IV angina equivalent symptoms, and extensive coronary calcium score, he underwent a cardiac cath which revealed pLAD 100% with R-L collaterals mid [id number]%, and LCx 30%. CT surgery, Dr. Perez consulted and on 12/10/18 patient underwent Robotic MIDCAB EF Nl. : OR uncomplicated.  Extubated POD 0.  Hx of afib, cardizem started on POD 2 for rate control, coumadin started for AF.  POD 3 transferred to floor where he has remained on 2L NC for baseline hypoxia with home oxygen arranged on POD 4.  He remained HD stable in rate controlled afib and on 12/15/18, POD 5, he was evaluated in AM and medically cleared for discharge to home with plan for follow-up with Dr. Perez and Dr. Carlisle in 1 week.  Upon clearance for travel back to [facility], he will further follow-up with his home cardiologist which will be arranged by Dr. Carlisle.  Over 30 minutes was spent with the patient reviewing the discharge material including medications, follow up appointments, recovery, concerning symptoms, and how to contact their health care providers if they have questions.

## 2018-12-14 NOTE — DISCHARGE NOTE ADULT - CARE PROVIDERS DIRECT ADDRESSES
[email or web address]

## 2018-12-14 NOTE — DISCHARGE NOTE ADULT - INSTRUCTIONS
Low salt, low cholesterol, low fat (DASH) diet.    1.	John Finley (MRN [id number])  a.	Dr. Perez: 12/18TH @ 12:00pm  bBassam Carlisle: 12/18TH @ 11:30am  [address], [facility] Floor: [phone]

## 2018-12-15 VITALS — DIASTOLIC BLOOD PRESSURE: 64 MMHG | SYSTOLIC BLOOD PRESSURE: 114 MMHG | HEART RATE: 80 BPM

## 2018-12-15 PROCEDURE — 71045 X-RAY EXAM CHEST 1 VIEW: CPT | Mod: 26

## 2018-12-15 RX ORDER — ACETAMINOPHEN 500 MG
2 TABLET ORAL
Qty: 240 | Refills: 0 | OUTPATIENT
Start: 2018-12-15 | End: 2019-01-13

## 2018-12-15 RX ORDER — DILTIAZEM HCL 120 MG
1 CAPSULE, EXT RELEASE 24 HR ORAL
Qty: 30 | Refills: 0 | OUTPATIENT
Start: 2018-12-15 | End: 2019-01-13

## 2018-12-15 RX ORDER — FUROSEMIDE 40 MG
0 TABLET ORAL
Qty: 0 | Refills: 0 | COMMUNITY

## 2018-12-15 RX ORDER — ATORVASTATIN CALCIUM 80 MG/1
1 TABLET, FILM COATED ORAL
Qty: 0 | Refills: 0 | COMMUNITY

## 2018-12-15 RX ORDER — METOPROLOL TARTRATE 50 MG
1 TABLET ORAL
Qty: 60 | Refills: 0 | OUTPATIENT
Start: 2018-12-15 | End: 2019-01-13

## 2018-12-15 RX ORDER — DILTIAZEM HCL 120 MG
1 CAPSULE, EXT RELEASE 24 HR ORAL
Qty: 0 | Refills: 0 | COMMUNITY

## 2018-12-15 RX ORDER — CLOPIDOGREL BISULFATE 75 MG/1
1 TABLET, FILM COATED ORAL
Qty: 30 | Refills: 0 | OUTPATIENT
Start: 2018-12-15 | End: 2019-01-13

## 2018-12-15 RX ORDER — WARFARIN SODIUM 2.5 MG/1
1 TABLET ORAL
Qty: 7 | Refills: 0 | OUTPATIENT
Start: 2018-12-15 | End: 2018-12-21

## 2018-12-15 RX ORDER — ASPIRIN/CALCIUM CARB/MAGNESIUM 324 MG
1 TABLET ORAL
Qty: 30 | Refills: 0 | OUTPATIENT
Start: 2018-12-15 | End: 2019-01-13

## 2018-12-15 RX ORDER — PANTOPRAZOLE SODIUM 20 MG/1
1 TABLET, DELAYED RELEASE ORAL
Qty: 30 | Refills: 0 | OUTPATIENT
Start: 2018-12-15 | End: 2019-01-13

## 2018-12-15 RX ORDER — POTASSIUM CHLORIDE 20 MEQ
1 PACKET (EA) ORAL
Qty: 0 | Refills: 0 | COMMUNITY

## 2018-12-15 RX ORDER — FUROSEMIDE 40 MG
1 TABLET ORAL
Qty: 14 | Refills: 0 | OUTPATIENT
Start: 2018-12-15 | End: 2018-12-28

## 2018-12-15 RX ORDER — WARFARIN SODIUM 2.5 MG/1
0 TABLET ORAL
Qty: 0 | Refills: 0 | COMMUNITY

## 2018-12-15 RX ORDER — RAMIPRIL 5 MG
1 CAPSULE ORAL
Qty: 0 | Refills: 0 | COMMUNITY

## 2018-12-15 RX ORDER — POTASSIUM CHLORIDE 20 MEQ
1 PACKET (EA) ORAL
Qty: 14 | Refills: 0 | OUTPATIENT
Start: 2018-12-15 | End: 2018-12-28

## 2018-12-15 RX ORDER — DOCUSATE SODIUM 100 MG
1 CAPSULE ORAL
Qty: 90 | Refills: 0 | OUTPATIENT
Start: 2018-12-15 | End: 2019-01-13

## 2018-12-15 RX ORDER — METOPROLOL TARTRATE 50 MG
1 TABLET ORAL
Qty: 0 | Refills: 0 | COMMUNITY

## 2018-12-15 RX ORDER — ATORVASTATIN CALCIUM 80 MG/1
1 TABLET, FILM COATED ORAL
Qty: 30 | Refills: 0 | OUTPATIENT
Start: 2018-12-15 | End: 2019-01-13

## 2018-12-15 RX ADMIN — Medication 40 MILLIGRAM(S): at 05:49

## 2018-12-15 RX ADMIN — FINASTERIDE 5 MILLIGRAM(S): 5 TABLET, FILM COATED ORAL at 11:17

## 2018-12-15 RX ADMIN — Medication 20 MILLIEQUIVALENT(S): at 11:17

## 2018-12-15 RX ADMIN — Medication 81 MILLIGRAM(S): at 11:17

## 2018-12-15 RX ADMIN — HEPARIN SODIUM 5000 UNIT(S): 5000 INJECTION INTRAVENOUS; SUBCUTANEOUS at 05:49

## 2018-12-15 RX ADMIN — PANTOPRAZOLE SODIUM 40 MILLIGRAM(S): 20 TABLET, DELAYED RELEASE ORAL at 06:08

## 2018-12-15 RX ADMIN — Medication 25 MILLIGRAM(S): at 00:17

## 2018-12-15 RX ADMIN — Medication 25 MILLIGRAM(S): at 05:49

## 2018-12-15 RX ADMIN — CLOPIDOGREL BISULFATE 75 MILLIGRAM(S): 75 TABLET, FILM COATED ORAL at 11:17

## 2018-12-15 RX ADMIN — Medication 25 MILLIGRAM(S): at 11:17

## 2018-12-15 RX ADMIN — Medication 100 MILLIGRAM(S): at 05:49

## 2018-12-15 RX ADMIN — SODIUM CHLORIDE 3 MILLILITER(S): 9 INJECTION INTRAMUSCULAR; INTRAVENOUS; SUBCUTANEOUS at 06:23

## 2018-12-15 RX ADMIN — CHLORHEXIDINE GLUCONATE 1 APPLICATION(S): 213 SOLUTION TOPICAL at 06:07

## 2018-12-17 ENCOUNTER — FORM ENCOUNTER (OUTPATIENT)
Age: 83
End: 2018-12-17

## 2018-12-18 ENCOUNTER — APPOINTMENT (OUTPATIENT)
Dept: CARDIOTHORACIC SURGERY | Facility: CLINIC | Age: 83
End: 2018-12-18
Payer: COMMERCIAL

## 2018-12-18 ENCOUNTER — APPOINTMENT (OUTPATIENT)
Dept: HEART AND VASCULAR | Facility: CLINIC | Age: 83
End: 2018-12-18
Payer: COMMERCIAL

## 2018-12-18 ENCOUNTER — OUTPATIENT (OUTPATIENT)
Dept: OUTPATIENT SERVICES | Facility: HOSPITAL | Age: 83
LOS: 1 days | End: 2018-12-18
Payer: COMMERCIAL

## 2018-12-18 ENCOUNTER — NON-APPOINTMENT (OUTPATIENT)
Age: 83
End: 2018-12-18

## 2018-12-18 VITALS
OXYGEN SATURATION: 98 % | TEMPERATURE: 97.3 F | HEART RATE: 103 BPM | DIASTOLIC BLOOD PRESSURE: 57 MMHG | RESPIRATION RATE: 21 BRPM | SYSTOLIC BLOOD PRESSURE: 108 MMHG

## 2018-12-18 VITALS — DIASTOLIC BLOOD PRESSURE: 72 MMHG | HEART RATE: 77 BPM | OXYGEN SATURATION: 96 % | SYSTOLIC BLOOD PRESSURE: 107 MMHG

## 2018-12-18 DIAGNOSIS — I25.2 OLD MYOCARDIAL INFARCTION: ICD-10-CM

## 2018-12-18 DIAGNOSIS — K56.609 UNSPECIFIED INTESTINAL OBSTRUCTION, UNSPECIFIED AS TO PARTIAL VERSUS COMPLETE OBSTRUCTION: Chronic | ICD-10-CM

## 2018-12-18 DIAGNOSIS — Z86.69 PERSONAL HISTORY OF OTHER DISEASES OF THE NERVOUS SYSTEM AND SENSE ORGANS: ICD-10-CM

## 2018-12-18 DIAGNOSIS — Z87.891 PERSONAL HISTORY OF NICOTINE DEPENDENCE: ICD-10-CM

## 2018-12-18 DIAGNOSIS — Z98.890 OTHER SPECIFIED POSTPROCEDURAL STATES: Chronic | ICD-10-CM

## 2018-12-18 DIAGNOSIS — Z87.11 PERSONAL HISTORY OF PEPTIC ULCER DISEASE: ICD-10-CM

## 2018-12-18 DIAGNOSIS — I50.32 CHRONIC DIASTOLIC (CONGESTIVE) HEART FAILURE: ICD-10-CM

## 2018-12-18 DIAGNOSIS — Z86.39 PERSONAL HISTORY OF OTHER ENDOCRINE, NUTRITIONAL AND METABOLIC DISEASE: ICD-10-CM

## 2018-12-18 DIAGNOSIS — Z86.79 PERSONAL HISTORY OF OTHER DISEASES OF THE CIRCULATORY SYSTEM: ICD-10-CM
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
## 2018-12-18 DIAGNOSIS — Z91.81 HISTORY OF FALLING: ICD-10-CM

## 2018-12-18 DIAGNOSIS — Z90.49 ACQUIRED ABSENCE OF OTHER SPECIFIED PARTS OF DIGESTIVE TRACT: Chronic | ICD-10-CM

## 2018-12-18 DIAGNOSIS — Z87.828 PERSONAL HISTORY OF OTHER (HEALED) PHYSICAL INJURY AND TRAUMA: Chronic | ICD-10-CM

## 2018-12-18 DIAGNOSIS — Z87.19 PERSONAL HISTORY OF OTHER DISEASES OF THE DIGESTIVE SYSTEM: ICD-10-CM

## 2018-12-18 LAB
ALBUMIN SERPL ELPH-MCNC: 3.3 G/DL — SIGNIFICANT CHANGE UP (ref 3.3–5)
ALP SERPL-CCNC: 74 U/L — SIGNIFICANT CHANGE UP (ref 40–120)
ALT FLD-CCNC: 40 U/L — SIGNIFICANT CHANGE UP (ref 10–45)
ANION GAP SERPL CALC-SCNC: 10 MMOL/L — SIGNIFICANT CHANGE UP (ref 5–17)
APTT BLD: 29.7 SEC — SIGNIFICANT CHANGE UP (ref 27.5–36.3)
AST SERPL-CCNC: 24 U/L — SIGNIFICANT CHANGE UP (ref 10–40)
BASOPHILS NFR BLD AUTO: 0.1 % — SIGNIFICANT CHANGE UP (ref 0–2)
BILIRUB SERPL-MCNC: 1 MG/DL — SIGNIFICANT CHANGE UP (ref 0.2–1.2)
BUN SERPL-MCNC: 25 MG/DL — HIGH (ref 7–23)
CALCIUM SERPL-MCNC: 10.9 MG/DL — HIGH (ref 8.4–10.5)
CHLORIDE SERPL-SCNC: 100 MMOL/L — SIGNIFICANT CHANGE UP (ref 96–108)
CO2 SERPL-SCNC: 30 MMOL/L — SIGNIFICANT CHANGE UP (ref 22–31)
CREAT SERPL-MCNC: 1.32 MG/DL — HIGH (ref 0.5–1.3)
EOSINOPHIL NFR BLD AUTO: 2.6 % — SIGNIFICANT CHANGE UP (ref 0–6)
GLUCOSE SERPL-MCNC: 96 MG/DL — SIGNIFICANT CHANGE UP (ref 70–99)
HCT VFR BLD CALC: 42.7 % — SIGNIFICANT CHANGE UP (ref 39–50)
HGB BLD-MCNC: 13.4 G/DL — SIGNIFICANT CHANGE UP (ref 13–17)
INR BLD: 2.08 — HIGH (ref 0.88–1.16)
LYMPHOCYTES # BLD AUTO: 8.8 % — LOW (ref 13–44)
MCHC RBC-ENTMCNC: 28.2 PG — SIGNIFICANT CHANGE UP (ref 27–34)
MCHC RBC-ENTMCNC: 31.4 G/DL — LOW (ref 32–36)
MCV RBC AUTO: 89.7 FL — SIGNIFICANT CHANGE UP (ref 80–100)
MONOCYTES NFR BLD AUTO: 4.9 % — SIGNIFICANT CHANGE UP (ref 2–14)
NEUTROPHILS NFR BLD AUTO: 83.6 % — HIGH (ref 43–77)
PLATELET # BLD AUTO: 162 K/UL — SIGNIFICANT CHANGE UP (ref 150–400)
POTASSIUM SERPL-MCNC: 4.7 MMOL/L — SIGNIFICANT CHANGE UP (ref 3.5–5.3)
POTASSIUM SERPL-SCNC: 4.7 MMOL/L — SIGNIFICANT CHANGE UP (ref 3.5–5.3)
PROT SERPL-MCNC: 6.3 G/DL — SIGNIFICANT CHANGE UP (ref 6–8.3)
PROTHROM AB SERPL-ACNC: 24.1 SEC — HIGH (ref 10–12.9)
RBC # BLD: 4.76 M/UL — SIGNIFICANT CHANGE UP (ref 4.2–5.8)
RBC # FLD: 14.6 % — SIGNIFICANT CHANGE UP (ref 10.3–16.9)
SODIUM SERPL-SCNC: 140 MMOL/L — SIGNIFICANT CHANGE UP (ref 135–145)
WBC # BLD: 11 K/UL — HIGH (ref 3.8–10.5)
WBC # FLD AUTO: 11 K/UL — HIGH (ref 3.8–10.5)

## 2018-12-18 PROCEDURE — 85025 COMPLETE CBC W/AUTO DIFF WBC: CPT

## 2018-12-18 PROCEDURE — 85610 PROTHROMBIN TIME: CPT

## 2018-12-18 PROCEDURE — 99024 POSTOP FOLLOW-UP VISIT: CPT

## 2018-12-18 PROCEDURE — 36415 COLL VENOUS BLD VENIPUNCTURE: CPT

## 2018-12-18 PROCEDURE — 71046 X-RAY EXAM CHEST 2 VIEWS: CPT | Mod: 26

## 2018-12-18 PROCEDURE — 80053 COMPREHEN METABOLIC PANEL: CPT

## 2018-12-18 PROCEDURE — 85730 THROMBOPLASTIN TIME PARTIAL: CPT

## 2018-12-18 PROCEDURE — 71046 X-RAY EXAM CHEST 2 VIEWS: CPT

## 2018-12-18 PROCEDURE — 99214 OFFICE O/P EST MOD 30 MIN: CPT

## 2018-12-18 RX ORDER — WARFARIN 4 MG/1
4 TABLET ORAL DAILY
Qty: 30 | Refills: 3 | Status: ACTIVE | COMMUNITY
Start: 1900-01-01 | End: 1900-01-01

## 2018-12-18 RX ORDER — OXYCODONE HYDROCHLORIDE AND ACETAMINOPHEN 5; 325 MG/1; MG/1
5-325 TABLET ORAL
Refills: 0 | Status: ACTIVE | COMMUNITY

## 2018-12-18 RX ORDER — METOPROLOL TARTRATE 50 MG/1
50 TABLET, FILM COATED ORAL
Qty: 60 | Refills: 3 | Status: ACTIVE | COMMUNITY

## 2018-12-18 RX ORDER — TERAZOSIN 2 MG/1
2 CAPSULE ORAL
Refills: 0 | Status: ACTIVE | COMMUNITY

## 2018-12-18 RX ORDER — DILTIAZEM HYDROCHLORIDE 60 MG/1
60 TABLET, COATED ORAL 4 TIMES DAILY
Refills: 0 | Status: COMPLETED | COMMUNITY
End: 2018-12-18

## 2018-12-18 RX ORDER — DILTIAZEM HYDROCHLORIDE 180 MG/1
180 CAPSULE, EXTENDED RELEASE ORAL DAILY
Refills: 0 | Status: ACTIVE | COMMUNITY

## 2018-12-19 RX ORDER — POTASSIUM CHLORIDE 1500 MG/1
20 TABLET, FILM COATED, EXTENDED RELEASE ORAL
Qty: 30 | Refills: 0 | Status: ACTIVE | COMMUNITY
Start: 1900-01-01 | End: 1900-01-01

## 2018-12-19 RX ORDER — FUROSEMIDE 40 MG/1
40 TABLET ORAL DAILY
Qty: 30 | Refills: 1 | Status: ACTIVE | COMMUNITY
Start: 1900-01-01 | End: 1900-01-01

## 2018-12-20 DIAGNOSIS — R29.6 REPEATED FALLS: ICD-10-CM

## 2018-12-20 DIAGNOSIS — Z87.891 PERSONAL HISTORY OF NICOTINE DEPENDENCE: ICD-10-CM

## 2018-12-20 DIAGNOSIS — I13.0 HYPERTENSIVE HEART AND CHRONIC KIDNEY DISEASE WITH HEART FAILURE AND STAGE 1 THROUGH STAGE 4 CHRONIC KIDNEY DISEASE, OR UNSPECIFIED CHRONIC KIDNEY DISEASE: ICD-10-CM

## 2018-12-20 DIAGNOSIS — G62.9 POLYNEUROPATHY, UNSPECIFIED: ICD-10-CM

## 2018-12-20 DIAGNOSIS — I48.2 CHRONIC ATRIAL FIBRILLATION: ICD-10-CM

## 2018-12-20 DIAGNOSIS — I25.110 ATHEROSCLEROTIC HEART DISEASE OF NATIVE CORONARY ARTERY WITH UNSTABLE ANGINA PECTORIS: ICD-10-CM

## 2018-12-20 DIAGNOSIS — I25.2 OLD MYOCARDIAL INFARCTION: ICD-10-CM

## 2018-12-20 DIAGNOSIS — D69.6 THROMBOCYTOPENIA, UNSPECIFIED: ICD-10-CM

## 2018-12-20 DIAGNOSIS — I50.33 ACUTE ON CHRONIC DIASTOLIC (CONGESTIVE) HEART FAILURE: ICD-10-CM

## 2018-12-20 DIAGNOSIS — E53.8 DEFICIENCY OF OTHER SPECIFIED B GROUP VITAMINS: ICD-10-CM

## 2018-12-20 DIAGNOSIS — K27.9 PEPTIC ULCER, SITE UNSPECIFIED, UNSPECIFIED AS ACUTE OR CHRONIC, WITHOUT HEMORRHAGE OR PERFORATION: ICD-10-CM

## 2018-12-20 DIAGNOSIS — N18.3 CHRONIC KIDNEY DISEASE, STAGE 3 (MODERATE): ICD-10-CM

## 2018-12-20 DIAGNOSIS — E66.9 OBESITY, UNSPECIFIED: ICD-10-CM

## 2018-12-20 DIAGNOSIS — Z28.21 IMMUNIZATION NOT CARRIED OUT BECAUSE OF PATIENT REFUSAL: ICD-10-CM

## 2018-12-20 DIAGNOSIS — Z88.0 ALLERGY STATUS TO PENICILLIN: ICD-10-CM

## 2018-12-20 DIAGNOSIS — Z95.5 PRESENCE OF CORONARY ANGIOPLASTY IMPLANT AND GRAFT: ICD-10-CM

## 2018-12-20 DIAGNOSIS — Z79.01 LONG TERM (CURRENT) USE OF ANTICOAGULANTS: ICD-10-CM

## 2018-12-20 DIAGNOSIS — G47.33 OBSTRUCTIVE SLEEP APNEA (ADULT) (PEDIATRIC): ICD-10-CM

## 2018-12-20 DIAGNOSIS — N40.0 BENIGN PROSTATIC HYPERPLASIA WITHOUT LOWER URINARY TRACT SYMPTOMS: ICD-10-CM

## 2018-12-20 DIAGNOSIS — J98.11 ATELECTASIS: ICD-10-CM

## 2018-12-20 DIAGNOSIS — I48.92 UNSPECIFIED ATRIAL FLUTTER: ICD-10-CM

## 2018-12-21 PROCEDURE — G0103: CPT

## 2018-12-21 PROCEDURE — 84481 FREE ASSAY (FT-3): CPT

## 2018-12-21 PROCEDURE — 93005 ELECTROCARDIOGRAM TRACING: CPT

## 2018-12-21 PROCEDURE — 82570 ASSAY OF URINE CREATININE: CPT

## 2018-12-21 PROCEDURE — 87186 SC STD MICRODIL/AGAR DIL: CPT

## 2018-12-21 PROCEDURE — 84132 ASSAY OF SERUM POTASSIUM: CPT

## 2018-12-21 PROCEDURE — 86850 RBC ANTIBODY SCREEN: CPT

## 2018-12-21 PROCEDURE — 86923 COMPATIBILITY TEST ELECTRIC: CPT

## 2018-12-21 PROCEDURE — C1894: CPT

## 2018-12-21 PROCEDURE — 83970 ASSAY OF PARATHORMONE: CPT

## 2018-12-21 PROCEDURE — 85027 COMPLETE CBC AUTOMATED: CPT

## 2018-12-21 PROCEDURE — 84443 ASSAY THYROID STIM HORMONE: CPT

## 2018-12-21 PROCEDURE — 82553 CREATINE MB FRACTION: CPT

## 2018-12-21 PROCEDURE — 83036 HEMOGLOBIN GLYCOSYLATED A1C: CPT

## 2018-12-21 PROCEDURE — S2900: CPT

## 2018-12-21 PROCEDURE — 82310 ASSAY OF CALCIUM: CPT

## 2018-12-21 PROCEDURE — 85025 COMPLETE CBC W/AUTO DIFF WBC: CPT

## 2018-12-21 PROCEDURE — 71046 X-RAY EXAM CHEST 2 VIEWS: CPT

## 2018-12-21 PROCEDURE — 84300 ASSAY OF URINE SODIUM: CPT

## 2018-12-21 PROCEDURE — C1769: CPT

## 2018-12-21 PROCEDURE — 82803 BLOOD GASES ANY COMBINATION: CPT

## 2018-12-21 PROCEDURE — 84295 ASSAY OF SERUM SODIUM: CPT

## 2018-12-21 PROCEDURE — 93306 TTE W/DOPPLER COMPLETE: CPT

## 2018-12-21 PROCEDURE — 82962 GLUCOSE BLOOD TEST: CPT

## 2018-12-21 PROCEDURE — 82550 ASSAY OF CK (CPK): CPT

## 2018-12-21 PROCEDURE — 76775 US EXAM ABDO BACK WALL LIM: CPT

## 2018-12-21 PROCEDURE — 85730 THROMBOPLASTIN TIME PARTIAL: CPT

## 2018-12-21 PROCEDURE — 76857 US EXAM PELVIC LIMITED: CPT

## 2018-12-21 PROCEDURE — 71250 CT THORAX DX C-: CPT

## 2018-12-21 PROCEDURE — 94660 CPAP INITIATION&MGMT: CPT

## 2018-12-21 PROCEDURE — C1887: CPT

## 2018-12-21 PROCEDURE — 81001 URINALYSIS AUTO W/SCOPE: CPT

## 2018-12-21 PROCEDURE — 86901 BLOOD TYPING SEROLOGIC RH(D): CPT

## 2018-12-21 PROCEDURE — P9045: CPT

## 2018-12-21 PROCEDURE — 36415 COLL VENOUS BLD VENIPUNCTURE: CPT

## 2018-12-21 PROCEDURE — 88112 CYTOPATH CELL ENHANCE TECH: CPT

## 2018-12-21 PROCEDURE — 82746 ASSAY OF FOLIC ACID SERUM: CPT

## 2018-12-21 PROCEDURE — 84100 ASSAY OF PHOSPHORUS: CPT

## 2018-12-21 PROCEDURE — 82306 VITAMIN D 25 HYDROXY: CPT

## 2018-12-21 PROCEDURE — 83880 ASSAY OF NATRIURETIC PEPTIDE: CPT

## 2018-12-21 PROCEDURE — 87086 URINE CULTURE/COLONY COUNT: CPT

## 2018-12-21 PROCEDURE — 80061 LIPID PANEL: CPT

## 2018-12-21 PROCEDURE — 82330 ASSAY OF CALCIUM: CPT

## 2018-12-21 PROCEDURE — 93880 EXTRACRANIAL BILAT STUDY: CPT

## 2018-12-21 PROCEDURE — 94150 VITAL CAPACITY TEST: CPT

## 2018-12-21 PROCEDURE — 82607 VITAMIN B-12: CPT

## 2018-12-21 PROCEDURE — 80053 COMPREHEN METABOLIC PANEL: CPT

## 2018-12-21 PROCEDURE — 86140 C-REACTIVE PROTEIN: CPT

## 2018-12-21 PROCEDURE — 85732 THROMBOPLASTIN TIME PARTIAL: CPT

## 2018-12-21 PROCEDURE — 84550 ASSAY OF BLOOD/URIC ACID: CPT

## 2018-12-21 PROCEDURE — 85610 PROTHROMBIN TIME: CPT

## 2018-12-21 PROCEDURE — 83605 ASSAY OF LACTIC ACID: CPT

## 2018-12-21 PROCEDURE — 71045 X-RAY EXAM CHEST 1 VIEW: CPT

## 2018-12-21 PROCEDURE — 84439 ASSAY OF FREE THYROXINE: CPT

## 2018-12-21 PROCEDURE — 97161 PT EVAL LOW COMPLEX 20 MIN: CPT

## 2018-12-21 PROCEDURE — 80076 HEPATIC FUNCTION PANEL: CPT

## 2018-12-21 PROCEDURE — 82652 VIT D 1 25-DIHYDROXY: CPT

## 2018-12-21 PROCEDURE — C1889: CPT

## 2018-12-21 PROCEDURE — 85670 THROMBIN TIME PLASMA: CPT

## 2018-12-21 PROCEDURE — 97116 GAIT TRAINING THERAPY: CPT

## 2018-12-21 PROCEDURE — 83735 ASSAY OF MAGNESIUM: CPT

## 2018-12-21 PROCEDURE — 84156 ASSAY OF PROTEIN URINE: CPT

## 2018-12-21 PROCEDURE — 83935 ASSAY OF URINE OSMOLALITY: CPT

## 2018-12-21 PROCEDURE — 86900 BLOOD TYPING SEROLOGIC ABO: CPT

## 2018-12-21 PROCEDURE — 80048 BASIC METABOLIC PNL TOTAL CA: CPT

## 2018-12-21 PROCEDURE — 94002 VENT MGMT INPAT INIT DAY: CPT

## 2019-01-28 ENCOUNTER — MESSAGE (OUTPATIENT)
Age: 84
End: 2019-01-28

## 2019-03-12 NOTE — PROGRESS NOTE ADULT - NS NEC GEN PE MLT EXAM PC
No bruits; no thyromegaly or nodules PAST MEDICAL HISTORY:  Agoraphobia with panic disorder     Anxiety     Cerebral infarction     Constipation     Dementia     Depressive disorder     Dermatitis     Folate deficiency anemia     GERD (gastroesophageal reflux disease)     HTN (hypertension)     Iron deficiency anemia     MI (myocardial infarction)     Spinal stenosis     Vitamin D deficiency

## 2020-01-17 NOTE — CONSULT NOTE ADULT - HEMATOLOGY/LYMPHATICS
[FreeTextEntry1] : \par \par Chest Pain/GERD: The patient is a sensation of an air bubble in his chest occasionally. Is unrelated to exertion. His unrelated to food intake. Exercise stress testing has been arranged to rule out ischemia. Transthoracic echocardiography has been arranged to rule out pericardial disease.\par \par Coronary artery calcification by CT: The patient is tolerating statin therapy.\par \par Hyperlipidemia: LFTs and cholesterol profile on simvastatin have been arranged.\par \par Renal mass: Urology evaluation has been recommended. negative

## 2021-10-24 NOTE — PATIENT PROFILE ADULT - OVER THE PAST TWO WEEKS, HAVE YOU FELT LITTLE INTEREST OR PLEASURE IN DOING THINGS?
acetaminophen 325 mg oral tablet: 2 tab(s) orally every 6 hours, As needed, Mild Pain (1 - 3)  famotidine 20 mg oral tablet: 1 tab(s) orally every 12 hours  oxyCODONE 5 mg oral tablet: 1 tab(s) orally every 4 hours, As needed, Severe Pain (7 - 10) MDD:4 tabs  
no

## 2022-05-17 NOTE — DIETITIAN INITIAL EVALUATION ADULT. - NUTRITION DIAGNOSTIC TERMINOLOGY #1
Knowledge and Beliefs Apixaban/Eliquis - Compliance/Apixaban/Eliquis - Dietary Advice/Apixaban/Eliquis - Follow up monitoring/Apixaban/Eliquis - Potential for adverse drug reactions and interactions

## 2023-03-06 NOTE — PHYSICAL THERAPY INITIAL EVALUATION ADULT - PERTINENT HX OF CURRENT PROBLEM, REHAB EVAL
Introduction Text (Please End With A Colon): The following procedure was deferred: excision with plastic or general surgeon
Size Of Lesion In Cm (Optional): 0.7
85 year old male initially presented to emergency room in [facility] c/o worsening shortness of breath with palpitations. In light of patient’s risk factors, CCS Class III-IV angina equivalent symptoms, and extensive coronary calcium score, he is recommended for right and left cardiac catheterization with possible intervention if clinically indicated.
Detail Level: Detailed
X Size Of Lesion In Cm (Optional): 0

## 2023-03-22 NOTE — PROGRESS NOTE ADULT - ASSESSMENT
CADD pump 5FU added via mediport to infuse at 5.4ml/hr over 46 hours. Connections secured and tape to chest. Patient and family instructed on pump- it's usage,what to do if alarm goes off, and who to call if any questions. Verified pump running before discharge. 85 year old M, former smoker, walks with cane, PMHx HTN, CAD s/p prior MI (denies history of stents), dCHF, atrial fibrillation/flutter on Coumadin (last dose 12/4/18), PUD (no history of GI bleeding), recently diagnosed KOKO (does not yet have machine for home), multiple falls in the last 2 months, ?CKD (Cr 1.30 in October 2018), who initially presented to emergency room in [facility] c/o worsening shortness of breath with palpitations. In light of patient’s risk factors, CCS Class III-IV angina equivalent symptoms, and extensive coronary calcium score, he underwent a cardiac cath which revealed pLAD 100% with R-L collaterals mid [id number]%, and LCx 30%. CT surgery, Dr. Perez consulted and on 12/10/18 patient underwent Robotic MIDCAB EF Nl. : OR uncomplicated.  Extubated POD 0.  Hx of afib, cardizem started on POD 2 for rate control, coumadin started for AF.  POD 3 transferred to floor     A/P:  Neurovascular: No delirium. Pain well controlled with current regimen.  -Tylenol prn pain    Cardiovascular: Hemodynamically stable. HR controlled.  -HX HTN, CAD s/p prior MI (denies history of stents), dCHF, atrial fibrillation/flutter on Coumadin, now s/p MIDCAB EF Nl  -continue cardizem     Respiratory: 02 Sat = 98% on RA.  -If on oxygen wean to RA from for O2 Sat > 93%.  -Encourage ambulation, C+DB and Use of IS 10x / hr while awake.  -CXR-    GI: Stable.  -PPX.  -Continue bowel regimen  -PO Diet.    Renal / : BUN/Cr  -Monitor renal function.  -Monitor I/O's.  -Replete lytes PRN    Endocrine:    -A1c.  -TSH.    Hematologic:  -f/u AM CBC    ID: Afebrile, WBC   -Observe for SIRS/Sepsis Syndrome.    Prophylaxis:  -DVT prophylaxis with 5000 SubQ Heparin q8h.  -SCD's    Disposition:  -Home when medically ready 85 year old M, former smoker, walks with cane, PMHx HTN, CAD s/p prior MI (denies history of stents), dCHF, atrial fibrillation/flutter on Coumadin (last dose 12/4/18), PUD (no history of GI bleeding), recently diagnosed KOKO (does not yet have machine for home), multiple falls in the last 2 months, ?CKD (Cr 1.30 in October 2018), who initially presented to emergency room in [facility] c/o worsening shortness of breath with palpitations. In light of patient’s risk factors, CCS Class III-IV angina equivalent symptoms, and extensive coronary calcium score, he underwent a cardiac cath which revealed pLAD 100% with R-L collaterals mid [id number]%, and LCx 30%. CT surgery, Dr. Perez consulted and on 12/10/18 patient underwent Robotic MIDCAB EF Nl. : OR uncomplicated.  Extubated POD 0.  Hx of afib, cardizem started on POD 2 for rate control, coumadin started for AF.  POD 3 transferred to floor     A/P:  Neurovascular: No delirium. Pain well controlled with current regimen.  -Tylenol prn pain    Cardiovascular: Hemodynamically stable. HR controlled.  -HX HTN, CAD s/p prior MI (denies history of stents), dCHF, atrial fibrillation/flutter on Coumadin, now s/p MIDCAB EF Nl  -continue cardizem 60gm q8 (titrated today), asa 81mg daily, plavix 75mg daily    Respiratory: 02 Sat = 98% on RA.  -If on oxygen wean to RA from for O2 Sat > 93%.  -Encourage ambulation, C+DB and Use of IS 10x / hr while awake.  -CXR-    GI: Stable.  -PPX.  -Continue bowel regimen  -PO Diet.    Renal / : BUN/Cr  -Monitor renal function.  -Monitor I/O's.  -Replete lytes PRN    Endocrine:    -A1c.  -TSH.    Hematologic:  -f/u AM CBC    ID: Afebrile, WBC   -Observe for SIRS/Sepsis Syndrome.    Prophylaxis:  -DVT prophylaxis with 5000 SubQ Heparin q8h.  -SCD's    Disposition:  -Home when medically ready 85 year old M, former smoker, walks with cane, PMHx HTN, CAD s/p prior MI (denies history of stents), dCHF, atrial fibrillation/flutter on Coumadin (last dose 12/4/18), PUD (no history of GI bleeding), recently diagnosed KOKO (does not yet have machine for home), multiple falls in the last 2 months, ?CKD (Cr 1.30 in October 2018), who initially presented to emergency room in [facility] c/o worsening shortness of breath with palpitations. In light of patient’s risk factors, CCS Class III-IV angina equivalent symptoms, and extensive coronary calcium score, he underwent a cardiac cath which revealed pLAD 100% with R-L collaterals mid [id number]%, and LCx 30%. CT surgery, Dr. Perez consulted and on 12/10/18 patient underwent Robotic MIDCAB EF Nl. : OR uncomplicated.  Extubated POD 0.  Hx of afib, cardizem started on POD 2 for rate control, coumadin started for AF.  POD 3 transferred to floor     A/P:  Neurovascular: No delirium. Pain well controlled with current regimen.  -Tylenol prn pain    Cardiovascular: Hemodynamically stable. HR controlled.  -HX HTN, CAD s/p prior MI (denies history of stents), dCHF, atrial fibrillation/flutter on Coumadin, now s/p MIDCAB EF Nl  -continue cardizem 60gm q8 (titrated today), asa 81mg daily, plavix 75mg daily, lasix 40mg PO daily, atorvastatin 80mg qhs  -BB contraindicated 2/2 being on cardizem  -coumadin 4mg tonight, AM INR 1.28, 4mg tonight, f/u AM INR  -montior hr/bp/tele    Respiratory: 02 Sat = 97% on 2LNC.  -Encourage ambulation, C+DB and Use of IS 10x / hr while awake.  -CXR- b/l atelectasis vs pleural effusion, continue diuresis, f/u AM PA/LAT  -Pulm following appreciate recs  -wean O2    GI: Stable.  -protonix for GI PPX.  -Continue bowel regimen  -PO Diet.    Renal / : BUN/Cr CKD stage III 11/1.04, (baseline 1.3)  -Monitor renal function.  -Monitor I/O's.  -Replete lytes PRN  -jordan out, f/u TOV  -Continue proscar/doxazosin for BPH  -Renal following appreciate recs    Endocrine:    -A1c 5.3-continue ISS, monitor FS  -TSH low, free t4 wnl    Hematologic: H&H 11/37, PLTs stable at 79  -f/u AM CBC  -heme onc following for thrombocytopenia, appreciate recs    ID: Afebrile, WBC 8.5  -Observe for SIRS/Sepsis Syndrome.    Prophylaxis:  -DVT prophylaxis with 5000 SubQ Heparin q8h, will stop when INR >2  -SCD's    Disposition:  -Home when medically ready 85 year old M, former smoker, walks with cane, PMHx HTN, CAD s/p prior MI (denies history of stents), dCHF, atrial fibrillation/flutter on Coumadin (last dose 12/4/18), PUD (no history of GI bleeding), recently diagnosed KOK[facility] (does not yet have machine for home), multiple falls in the last 2 months, ?CKD (Cr 1.30 in October 2018), who initially presented to emergency room in [facility] c/o worsening shortness of breath with palpitations. In light of patient’s risk factors, CCS Class III-IV angina equivalent symptoms, and extensive coronary calcium score, he underwent a cardiac cath which revealed pLAD 100% with R-L collaterals mid [id number]%, and LCx 30%. CT surgery, Dr. Perez consulted and on 12/10/18 patient underwent Robotic MIDCAB EF Nl. : OR uncomplicated.  Extubated POD 0.  Hx of afib, cardizem started on POD 2 for rate control, coumadin started for AF.  POD 3 transferred to floor     A/P:  Neurovascular: No delirium. Pain well controlled with current regimen.  -Tylenol prn pain    Cardiovascular: Hemodynamically stable. HR controlled.  -HX HTN, CAD s/p prior MI (denies history of stents), dCHF, atrial fibrillation/flutter on Coumadin, now s/p MIDCAB EF Nl  -continue cardizem 60gm q8 (titrated today), metoprolol 12.5mg BID (titrate as tolerated), asa 81mg daily, plavix 75mg daily, lasix 40mg PO daily, atorvastatin 80mg qhs  -coumadin 4mg tonight, AM INR 1.28, 4mg tonight, f/u AM INR  -montior hr/bp/tele    Respiratory: 02 Sat = 97% on 2LNC.  -Encourage ambulation, C+DB and Use of IS 10x / hr while awake.  -CXR- b/l atelectasis vs pleural effusion, continue diuresis, f/u AM PA/LAT  -Pulm following appreciate recs  -wean O2    GI: Stable.  -protonix for GI PPX.  -Continue bowel regimen  -PO Diet.    Renal / : BUN/Cr CKD stage III 11/1.04, (baseline 1.3)  -Monitor renal function.  -Monitor I/O's.  -Replete lytes PRN  -jordan out, f/u TOV  -Continue proscar/doxazosin for BPH  -Renal following appreciate recs    Endocrine:    -A1c 5.3-continue ISS, monitor FS  -TSH low, free t4 wnl    Hematologic: H&H 11/37, PLTs stable at 79  -f/u AM CBC  -heme onc following for thrombocytopenia, appreciate recs    ID: Afebrile, WBC 8.5  -Observe for SIRS/Sepsis Syndrome.    Prophylaxis:  -DVT prophylaxis with 5000 SubQ Heparin q8h, will stop when INR >2  -SCD's    Disposition:  -Home when medically ready